# Patient Record
Sex: FEMALE | Race: WHITE | NOT HISPANIC OR LATINO | Employment: OTHER | URBAN - METROPOLITAN AREA
[De-identification: names, ages, dates, MRNs, and addresses within clinical notes are randomized per-mention and may not be internally consistent; named-entity substitution may affect disease eponyms.]

---

## 2018-01-20 ENCOUNTER — OFFICE VISIT - HEALTHEAST (OUTPATIENT)
Dept: FAMILY MEDICINE | Facility: CLINIC | Age: 68
End: 2018-01-20

## 2018-01-20 DIAGNOSIS — J01.20 SUBACUTE ETHMOIDAL SINUSITIS: ICD-10-CM

## 2019-01-23 ENCOUNTER — RECORDS - HEALTHEAST (OUTPATIENT)
Dept: ADMINISTRATIVE | Facility: OTHER | Age: 69
End: 2019-01-23

## 2019-01-23 ENCOUNTER — OFFICE VISIT (OUTPATIENT)
Dept: FAMILY MEDICINE | Facility: CLINIC | Age: 69
End: 2019-01-23
Payer: COMMERCIAL

## 2019-01-23 VITALS
SYSTOLIC BLOOD PRESSURE: 145 MMHG | DIASTOLIC BLOOD PRESSURE: 75 MMHG | RESPIRATION RATE: 18 BRPM | HEIGHT: 64 IN | TEMPERATURE: 97.8 F | OXYGEN SATURATION: 98 % | BODY MASS INDEX: 29.71 KG/M2 | HEART RATE: 90 BPM | WEIGHT: 174 LBS

## 2019-01-23 DIAGNOSIS — I10 ESSENTIAL HYPERTENSION: ICD-10-CM

## 2019-01-23 DIAGNOSIS — Z00.00 WELLNESS EXAMINATION: Primary | ICD-10-CM

## 2019-01-23 DIAGNOSIS — K21.9 GASTROESOPHAGEAL REFLUX DISEASE, ESOPHAGITIS PRESENCE NOT SPECIFIED: ICD-10-CM

## 2019-01-23 DIAGNOSIS — Z00.00 ROUTINE GENERAL MEDICAL EXAMINATION AT A HEALTH CARE FACILITY: Primary | ICD-10-CM

## 2019-01-23 LAB
ALT SERPL-CCNC: 33 U/L (ref 0–45)
AST SERPL-CCNC: 42 U/L (ref 0–45)
BUN SERPL-MCNC: 16 MG/DL (ref 7–30)
CALCIUM SERPL-MCNC: 9.7 MG/DL (ref 8.5–10.4)
CHLORIDE SERPLBLD-SCNC: 105 MMOL/L (ref 94–109)
CHOLEST SERPL-MCNC: 250 MG/DL
CHOLEST/HDLC SERPL: 3.6 RATIO
CO2 SERPL-SCNC: 27 MMOL/L (ref 20–32)
CREAT SERPL-MCNC: 0.8 MG/DL (ref 0.6–1.3)
EGFR CALCULATED (BLACK REFERENCE): >90 ML/MIN
EGFR CALCULATED (NON BLACK REFERENCE): 76.3 ML/MIN
GLUCOSE SERPL-MCNC: 88 MG/DL (ref 60–109)
GLUCOSE SERPL-MCNC: 88 MG/DL (ref 60–109)
HCT VFR BLD AUTO: 44 % (ref 35–47)
HDLC SERPL-MCNC: 69 MG/DL
HEMOGLOBIN: 13.5 G/DL (ref 11.7–15.7)
LDLC SERPL CALC-MCNC: 152 MG/DL (ref 0–99)
MCH RBC QN AUTO: 29.5 PG (ref 26.5–35)
MCHC RBC AUTO-ENTMCNC: 30.7 G/DL (ref 32–36)
MCV RBC AUTO: 96.2 FL (ref 78–100)
PLATELET # BLD AUTO: 269 K/UL (ref 150–450)
POTASSIUM SERPL-SCNC: 3.6 MMOL/L (ref 3.4–5.3)
RBC # BLD AUTO: 4.57 M/UL (ref 3.8–5.2)
SODIUM SERPL-SCNC: 143 MMOL/L (ref 133–144)
TRIGL SERPL-MCNC: 141 MG/DL
VLDL-CHOLESTEROL: 28 MG/DL (ref 7–32)
WBC # BLD AUTO: 9.3 K/UL (ref 4–11)

## 2019-01-23 RX ORDER — OMEPRAZOLE 20 MG/1
20 TABLET, DELAYED RELEASE ORAL DAILY
Qty: 90 TABLET | Refills: 1 | Status: SHIPPED | OUTPATIENT
Start: 2019-01-23 | End: 2019-11-11

## 2019-01-23 SDOH — HEALTH STABILITY: MENTAL HEALTH: HOW MANY STANDARD DRINKS CONTAINING ALCOHOL DO YOU HAVE ON A TYPICAL DAY?: 1 OR 2

## 2019-01-23 SDOH — HEALTH STABILITY: MENTAL HEALTH: HOW OFTEN DO YOU HAVE A DRINK CONTAINING ALCOHOL?: 2-4 TIMES A MONTH

## 2019-01-23 ASSESSMENT — MIFFLIN-ST. JEOR: SCORE: 1314.26

## 2019-01-23 ASSESSMENT — PATIENT HEALTH QUESTIONNAIRE - PHQ9: SUM OF ALL RESPONSES TO PHQ QUESTIONS 1-9: 1

## 2019-01-23 NOTE — NURSING NOTE
Medicare Wellness Visit  Health Risk Assessment           Health Risk Assessment / Review of Systems     Constitutional: Any fevers or night sweats? No     Eyes:  Vision problems   No     Hearing Do you feel you have hearing loss?  No     Cardiovascular: Any chest pain, fast or irregular heart beat, calf pain with walking?     No           Respiratory:   Any breathing problems or cough?   No     Gastrointestinal: Any stomach or stool problems?   No      Genitourinary: Do you have difficulty controlling urination?   No     Muscles and Joints: Any joint stiffness or soreness?   No - hx known arthritis in bilateral knee.    Skin: Any concerning lesions or moles?   No     Nervous System: Any loss of strength or feeling, numbness or tingling, shaking, dizziness, or headache?  No     Mental Health: Any depression, anxiety or problems sleeping? Yes- has noticed being more worrisome than usual,more anxiousness. Trigger- worried about her  and his medical health.     Cognition: Do you have any problems with your memory?  No            Medical Care     What other specialists or organizations are involved in your medical care?  NONE  Patient Care Team       Relationship Specialty Notifications Start End    Jamzin Huertas MD PCP - General Family Practice  1/21/19     Phone: [phone] Fax: [phone]         [address] [place], [place] [id number]          Have you been to the ER or overnight in the hospital in the last year?  No          Social History / Home Safety       Marital Status:  Who lives in your household?     Do you feel threatened or controlled by a partner, ex-partner or anyone in your life? No     Has anyone hurt you physically, for example by pushing, hitting, slapping or kicking you   or forcing you to have sex? No          Does your home have any of the following safety concerns; loose rugs in the hallway,  bathrooms with no grab bars by the tub or toilet, stairs with no handrails  or poorly lit areas?  No     Do you need help with dressing yourself, bathing, eating or getting around your home?  No     Do you need help with the phone, transportation, shopping, preparing meals, housework, laundry, medications or managing money?No       Risk Behaviors and Healthy Habits     History   Smoking Status     Not on file   Smokeless Tobacco     Not on file     How many servings of fruits and vegetables do you eat a day? < 1 serving a day. Advised and daily recommendation of eating 4-5 servings of fruits and vegetables a day. Nolvia states she has been trying to increase intake which started this week.    Exercise: previously no routine exercise, during summer time will do yard work and gardening. Just this week started back on getting into routine. Treadmill and light weights x 30 mins.    Do you frequently drive without a seatbelt? No     Do you use tobacco?  No     Do you use any other drugs? No         Do you use alcohol?Yes  Number of drinks per day -0-1  Number of drinking days a week - 2 drinks on weekend, does not feel her intake is an issue.      Frailty Assessment            Have you lost 10 or more pounds unintentionally in the previous year? No     How difficult is walking from one room to the other on the same level?not       Is it difficult to lift or carry something as heavy as 10 pounds?not      Compared with most (men/women) your age, would you say  that you are more active, less active, or about the same?  Same        FALL RISK ASSESSMENT 1/23/2019   Fallen 2 or more times in the past year? No   Any fall with injury in the past year? No     Timed up and go test- 8 seconds    Advance Directives:   Discussed with patient and family as appropriate. Has patient  completed advance directives and/or a living will? Yes- blank copy of an advance directive has been given to Nolvia to take home, review and complete.    Kristy Clements RN

## 2019-01-23 NOTE — NURSING NOTE
I completed a vision exam on Nolvia Kimball, pt passed.    L eye 20/25  R eye 20/20  Both eyes 20/20    Maira Ramirez, CMA

## 2019-01-23 NOTE — PROGRESS NOTES
SUBJECTIVE:                                                                                Nolvia Kimball is a 68 year old female who presents for a Welcome to Medicare Visit.    All Histories reviewed and updated in EPIC as appropriate.    Patient frequently cares for her  Abilio but neglects to take time to see the doctor herself. She therefore is behind on a lot of her screenings. She decided it's time to take care of herself too!    Takes her BP at home and it's always over 140- she'd like to start a BP med today.       Visual Acuity:  L eye 20/25  R eye 20/20  Both eyes 20/20    Social History     Tobacco Use     Smoking status: Never Smoker     Smokeless tobacco: Never Used   Substance Use Topics     Alcohol use: Not on file       Diet: Drinking more water, eating more fruits and veggies than in the past, smaller portions, trying to avoid processed sugars.   Physical Activity: patient exercises 3-5 times weekly- uses treadmill and bike and weights. Goal is daily.     The patient does not drink >3 drinks per day nor >7 drinks per week.      Today's PHQ-2 Score:      Do you have a Health Care Directive? No: Advance care planning reviewed with patient; information given to patient to review.    Current providers sharing in care for this patient include:   Patient Care Team:  Jazmin Huertas MD as PCP - General (Family Practice)      Hearing impairment: No    Ability to successfully perform activities of daily living: Yes, no assistance needed    Fall risk:   Fallen 2 or more times in the past year?: No    Any fall with injury in the past year?: No    Home safety:  none identified      The following health maintenance items are reviewed in Epic and correct as of today:  Health Maintenance   Topic Date Due     PHQ-2 Q1 YR  05/13/1964     HEPATITIS C SCREENING  05/13/1970     DTAP/TDAP/TD IMMUNIZATION (1 - Tdap) 05/13/1977     MAMMO SCREEN Q2 YR (SYSTEM ASSIGNED)  05/13/1992     LIPID SCREEN Q5 YR FEMALE  "(SYSTEM ASSIGNED)  1997     COLON CANCER SCREEN (SYSTEM ASSIGNED)  2002     ZOSTER IMMUNIZATION (1 of 2) 2002     ADVANCE DIRECTIVE PLANNING Q5 YRS  2007     FALL RISK ASSESSMENT  2017     DEXA SCAN SCREENING (SYSTEM ASSIGNED)  2017     INFLUENZA VACCINE (1) 2018     IPV IMMUNIZATION  Aged Out     MENINGITIS IMMUNIZATION  Aged Out       ROS:  Constitutional, HEENT, cardiovascular, pulmonary, GI, , musculoskeletal, neuro, skin, endocrine and psych systems are negative, except as otherwise noted.      SCREENING FOR PREVENTION and EARLY DETECTION     ECG (if done)not performed    Screening Lipid Level (covered every 5 years ): Recommended and patient accepted testing.  Cervical cancer screening:  Covered  until age 70 every 2 years unless high risk):  Testing not indicated -- patient has completed adequate lifetime screening  HIV screening (at risk ):  Testing not indicated   Colon CA Screening (>50-75 ) (FITT annually or colonoscopy every 10years):  Recommended and patient accepted testing.  Breast CA Screenin-2 years 50-74years:  Recommended and patient accepted testing., Dexa Scan (>65 yrs) (covered every 2 years):  Recommended and patient accepted testing. and Diabetes Screening : [facility] covered if at risk (obesity, HTN, dyslipidemia, FH): Recommended and patient accepted testing.    CV Risk based on Pooled Cohort Risk: 8%  Pooled Cohort Risk Calculator    Advanced Directives: Discussed and patient desires to to have further information and discuss with family.     Reviewed Immunization Record Today  Not due      OBJECTIVE:                                                                                    Vitals: /75 (BP Location: Left arm, Patient Position: Sitting, Cuff Size: Adult Regular)   Pulse 90   Temp 97.8  F (36.6  C) (Oral)   Resp 18   Ht 1.626 m (5' 4\")   Wt 78.9 kg (174 lb)   SpO2 98%   BMI 29.87 kg/m    BMI= Body mass index is 29.87 " kg/m .    EXAM:   GENERAL APPEARANCE: healthy, alert and no distress  EYES: Eyes grossly normal to inspection, PERRL and conjunctivae and sclerae normal  HENT: ear canals and TM's normal, nose and mouth without ulcers or lesions, oropharynx clear and oral mucous membranes moist  NECK: no adenopathy, no asymmetry, masses, or scars and thyroid normal to palpation  RESP: lungs clear to auscultation - no rales, rhonchi or wheezes  CV: regular rate and rhythm, normal S1 S2, no S3 or S4, no murmur, click or rub, no peripheral edema and peripheral pulses strong  ABDOMEN: soft, nontender, no hepatosplenomegaly, no masses and bowel sounds normal  MS: no musculoskeletal defects are noted and gait is age appropriate without ataxia  SKIN: no suspicious lesions or rashes  NEURO: Normal strength and tone, sensory exam grossly normal, mentation intact and speech normal  PSYCH: mentation appears normal and affect normal/bright    ASSESSMENT/PLAN:                                                                    1. Routine general medical examination at a health care facility  - GASTROENTEROLOGY ADULT REF PROCEDURE ONLY Other; ([facility], Dr. Reagan)  - Dexa hip/pelvis/spine*; Future  - MA SCREENING DIGITAL BILAT; Future  - Hepatitis C Antibody ([facility])  - Hepatitis B Surface Ag ([facility])  - Lipid Panel Plus ([facility]) - Results < 1 hr  - Basic Metabolic Panel ([facility]) - Results < 1 hr  - CBC with Plt ([facility])    2. Gastroesophageal reflux disease, esophagitis presence not specified  Already tried OTC H2 blocker. Discussed lifestyle changes plus will trial PPI.   - omeprazole (PRILOSEC OTC) 20 MG EC tablet; Take 1 tablet (20 mg) by mouth daily  Dispense: 90 tablet; Refill: 1    3. Essential hypertension  Home measurements are all elevated. Based on renal function, safe to try lisinopril. Start with 10mg daily, RTC in 2 weeks for BP check and BMP.       End of Life Planning:   Patient currently has an advanced  "directive: No.  I have verified the patient's ablity to prepare an advanced directive/make health care decisions.  Literature was provided to assist patient in preparing an advanced directive.    COUNSELING:  Reviewed preventive health counseling, as reflected in patient instructions       Regular exercise       Healthy diet/nutrition       Dental care       Aspirin Prophylaxsis-- not indicated       Alcohol Use -- she has no problematic alcohol use       Osteoporosis Prevention/Bone Health-- will complete DEXA       Consider lung cancer screening for ages 55-80 years and 30 pack-year smoking history --she does not qualify for this s       Colon cancer screening       Hepatitis C screening       The 10-year ASCVD risk score (Martinezgarrett DACOSTA Jr., et al., 2013) is: 8%           Advanced Planning   Estimated body mass index is 29.87 kg/m  as calculated from the following:    Height as of this encounter: 1.626 m (5' 4\").    Weight as of this encounter: 78.9 kg (174 lb).  Weight management plan: Discussed healthy diet and exercise guidelines   reports that  has never smoked. she has never used smokeless tobacco.      Appropriate preventive services were discussed with this patient, including applicable screening as appropriate for cardiovascular disease, diabetes, osteopenia/osteoporosis, and glaucoma.  As appropriate for age/gender, discussed screening for colorectal cancer, prostate cancer, breast cancer, and cervical cancer. Checklist reviewing preventive services available has been given to the patient.    Reviewed patients plan of care and provided an AVS. The Basic Care Plan (routine screening as documented in Health Maintenance) for Nolvia meets the Care Plan requirement. This Care Plan has been established and reviewed with the Patient.    Jazmin Huertas MD  [facility]  "

## 2019-01-23 NOTE — PATIENT INSTRUCTIONS
PERSONAL PREVENTIVE SERVICES PLAN - SERVICES     Review these tests with your medical staff then decide which ones you want and take this page home for your reference      SCREENING TESTS     Description   Year of Last Screening   Recommended Today?   Heart disease screening blood tests    Cholesterol level Reducing cholesterol can reduce your risk of heart attacks by 25%.  Screening is recommended yearly if you are at risk of heart disease otherwise every 4-5 years 5 yrs or more Yes; Recommended-pt is fasting today   Diabetes screening tests    Hemoglobin A1c blood test   Finding and treating diabetes early can reduce complications.  Screening recommended/covered yearly if you have high blood pressure, high cholesterol, obesity (BMI >30), or a history of high blood glucose tests; or 2 of the following: family history of diabetes, overweight (BMI >25 but <30), age 65 years or older, and a history of diabetes of pregnancy or gave birth to baby weighing more than 9 lbs.  Yes; Recommended    Hepatitis B screening Finding hepatitis B early can reduce complications.  Screening is recommended for persons with selected risk factors. 35 yrs ago,informed had Hep B exposure Yes; Recommended    Hepatitis C screening Finding hepatitis C early can reduce complications.  Screening is recommended for all persons born from 1945 through 1965 and for those with selected other risk factors.   Yes; Recommended    HIV screening Finding HIV early can reduce complications.  Screening is recommended for persons with risk factors for HIV infection.  No: is not indicated today.   Glaucoma screening Early detection of glaucoma can prevent blindness.   Please talk to your eye doctor about this.       SCREENING TESTS     Description   Year of Last Screening   Recommended Today?   Colorectal cancer screening    Fecal occult blood test     Screening colonoscopy Screening for colon cancer has been shown to reduce death from colon cancer by  25-30%. Screening recommended to start at 50 years and continuing until age 75 years.    Yes; Recommended    Breast Cancer Screening (women)    Mammogram Mammogram screening for breast cancer has been shown to reduce the risk of dying from breast cancer and prolong life. Screening is recommended every 1-2 years for women aged 50 to 74 years.  2014 Yes; Recommended    Cervical Cancer screening (women)    Pap Cervical pap smears can reduce cervical cancer. Screening is recommended annually if high risk (history of abnormal pap smears) otherwise every 2-3 years, stop screening at 65 years of age if history of normal paps. 2014 No: is not indicated today.   Screening for Osteoporosis:  Bone mass measurements (women)    Dexa Scan Screening and treating Osteoporosis can reduce the risk of hip and spine fractures. Screening is recommended in women 65 years or older and in women and men at risk of osteoporosis.  Yes; Recommended    Screening for Lung Cancer     Low-dose CT scanning Screening can reduce mortality in persons aged 55-80 who have smoked at least 30 pack-years and who are either still smoking or have quit in the past 15 years.  No: is not indicated today.   Abdominal Aortic Aneurysm (AAA) screening    Ultrasound (US)   An aneurysm treated before rupture is very safe -a ruptured aneurysm can be fatal.  Screening  by US for AAA is limited to patients who meet one of the following criteria:    Men who are 65-75 years old and have smoked more than 100 cigarettes in their lifetime    Anyone with a family history of abdominal aortic aneurysm  No: is not indicated today.     Here are your recommended immunizations.  Take this home for your reference.                                                    IMMUNIZATIONS Description Recommend today?     Influenza (Flu shot) Prevents flu; should get every year No; is up to date. Fall 2018   PCV 13 Pneumonia vaccination; you get it once Yes; Recommended    PPSV 23 Second  pneumonia vaccination; usually get it 1 year after PCV 13 No: is not indicated today.   Zoster (Shingles) Prevents shingles; you get it once  (Check with Part D insurance for coverage, must receive at a pharmacy, not clinic) Yes; Recommended    Tetanus Prevents tetanus; once every 10 years No: is not indicated today. Last vaccination was 2014     Hepatitis B  If you have any of the following risk factors you should be immunized for hepatitis B: severe kidney disease, people who live in the same house as a carrier of Hepatitis B virus, people who live in  institutions (e.g. nursing homes or group homes), homosexual men, patients with hemophilia who received Factor VIII or IX concentrates, abusers of illicit injectable drugs No: is not indicated today.      PATIENT INSTRUCTIONS    Yearly exam:     See your health care provider every year in order to review changes in your health, review medicines that you take, and discuss preventive care needs such as immunizations and cancer screening.    Get a flu shot each year.     Advance Directives:    If you have not done so, you are encouraged to complete advance directives and/or a living will.   More information about advance directives can be found at: http://www.[place]med.org/advocacy/Key-Issues/Advance-Directives    Nutrition:     Eat at least 5 servings of fruits and vegetables each day.     Eat whole-grain bread, whole-wheat pasta and brown rice instead of white grains and rice.     Talk to your doctor about Calcium and Vitamin D.     Lifestyle:    Exercise for at least 150 minutes a week (30 minutes a day, 5 days a week). This will help you control your weight and prevent disease.     Limit alcohol to one drink per day.     If you smoke, try to quit - your doctor will be happy to help.     Wear sunscreen to prevent skin cancer.     See your dentist every six months for an exam and cleaning.     See your eye doctor every 1 to 2 years to screen for conditions such as  glaucoma, macular degeneration and cataracts.                            Preventive Health Recommendations    See your health care provider every year to    Review health changes.     Discuss preventive care.      Review your medicines if your doctor has prescribed any.      You no longer need a yearly Pap test unless you've had an abnormal Pap test in the past 10 years. If you have vaginal symptoms, such as bleeding or discharge, be sure to talk with your provider about a Pap test.      Every 1 to 2 years, have a mammogram.  If you are over 69, talk with your health care provider about whether or not you want to continue having screening mammograms.      Every 10 years, have a colonoscopy. Or, have a yearly FIT test (stool test). These exams will check for colon cancer.       Have a cholesterol test every 5 years, or more often if your doctor advises it.       Have a diabetes test (fasting glucose) every three years. If you are at risk for diabetes, you should have this test more often.       At age 65, have a bone density scan (DEXA) to check for osteoporosis (brittle bone disease).    Shots:    Get a flu shot each year.    Get a tetanus shot every 10 years.    Talk to your doctor about your pneumonia vaccines. There are now two you should receive - Pneumovax (PPSV 23) and Prevnar (PCV 13).    Talk to your pharmacist about the shingles vaccine.    Talk to your doctor about the hepatitis B vaccine.    Nutrition:     Eat at least 5 servings of fruits and vegetables each day.      Eat whole-grain bread, whole-wheat pasta and brown rice instead of white grains and rice.      Get adequate Calcium and Vitamin D.     Lifestyle    Exercise at least 150 minutes a week (30 minutes a day, 5 days a week). This will help you control your weight and prevent disease.      Limit alcohol to one drink per day.      No smoking.       Wear sunscreen to prevent skin cancer.       See your dentist twice a year for an exam and  cleaning.      See your eye doctor every 1 to 2 years to screen for conditions such as glaucoma, macular degeneration and cataracts.    Personalized Prevention Plan  You are due for the preventive services outlined below.  Your care team is available to assist you in scheduling these services.  If you have already completed any of these items, please share that information with your care team to update in your medical record.  Health Maintenance Due   Topic Date Due     Depression Assessment 2 - yearly  05/13/1964     Hepatitis C Screening  05/13/1970     Diptheria Tetanus Pertussis (DTAP/TDAP/TD) Vaccine (1 - Tdap) 05/13/1977     Mammogram - every 2 years  05/13/1992     Cholesterol Lab - every 5 years  05/13/1997     Colon Cancer Screening - every 10 years.  05/13/2002     Zoster (Chicken Pox) Vaccine (1 of 2) 05/13/2002     Discuss Advance Directive Planning  05/13/2007     FALL RISK ASSESSMENT  05/13/2017     Bone Density Screening (Dexa)  05/13/2017     Flu Vaccine (1) 09/01/2018       Referral for ( TEST )  :      Colonoscopy   LOCATION/PLACE/Provider :    [facility]   DATE & TIME :     2-7-2019 at 7:00am, arriving by 6:00am  PHONE :     [phone]  FAX :     [phone]  Appointment made by clinic staff/:    Clementina

## 2019-01-24 LAB
HBV SURFACE AG SERPL QL IA: NEGATIVE
HCV AB SER QL: NEGATIVE

## 2019-01-28 ENCOUNTER — MYC MEDICAL ADVICE (OUTPATIENT)
Dept: FAMILY MEDICINE | Facility: CLINIC | Age: 69
End: 2019-01-28

## 2019-01-28 ENCOUNTER — RECORDS - HEALTHEAST (OUTPATIENT)
Dept: ADMINISTRATIVE | Facility: OTHER | Age: 69
End: 2019-01-28

## 2019-01-28 RX ORDER — LISINOPRIL 10 MG/1
10 TABLET ORAL DAILY
Qty: 30 TABLET | Refills: 0 | Status: SHIPPED | OUTPATIENT
Start: 2019-01-28 | End: 2019-02-13

## 2019-01-28 RX ORDER — ATORVASTATIN CALCIUM 40 MG/1
40 TABLET, FILM COATED ORAL DAILY
Qty: 90 TABLET | Refills: 3 | Status: SHIPPED | OUTPATIENT
Start: 2019-01-28 | End: 2019-02-13 | Stop reason: SINTOL

## 2019-01-28 NOTE — TELEPHONE ENCOUNTER
Of note: correct Lisinopril is not a refill but a new start of medication for essential hypertension after clinic visit and discussion. Phone conversation to clarify all questions. Per Nolvia feels comfortable starting Lisinopril 10 mg, will plan on following up in two weeks as recommended. Will start Lisinopril for a few days then start Atorvastatin in case should experience any side effects, this can be easily identified. Both medications will be new to Nolvia. Jordana JOY

## 2019-01-29 DIAGNOSIS — Z00.00 ROUTINE GENERAL MEDICAL EXAMINATION AT A HEALTH CARE FACILITY: ICD-10-CM

## 2019-01-31 ENCOUNTER — TELEPHONE (OUTPATIENT)
Dept: FAMILY MEDICINE | Facility: CLINIC | Age: 69
End: 2019-01-31

## 2019-01-31 NOTE — TELEPHONE ENCOUNTER
[facility] phone call message- general phone call:    Reason for call: Patient states she has an upcoming colonoscopy and her notes state that she is able to take her current medications for high blood pressure and diabetes. She's wondering if she would still be able to take her Lipitor and an-acid. Please call and advise.       Return call needed: Yes    OK to leave a message on voice mail? Yes    Primary language: English      needed? No    Call taken on January 31, 2019 at 8:49 AM by Fabiana Marin

## 2019-02-11 ENCOUNTER — HOSPITAL ENCOUNTER (OUTPATIENT)
Dept: MAMMOGRAPHY | Facility: CLINIC | Age: 69
Discharge: HOME | End: 2019-02-11
Attending: STUDENT IN AN ORGANIZED HEALTH CARE EDUCATION/TRAINING PROGRAM

## 2019-02-11 DIAGNOSIS — Z12.31 VISIT FOR SCREENING MAMMOGRAM: ICD-10-CM

## 2019-02-12 ENCOUNTER — RECORDS - HEALTHEAST (OUTPATIENT)
Dept: ADMINISTRATIVE | Facility: OTHER | Age: 69
End: 2019-02-12

## 2019-02-13 ENCOUNTER — AMBULATORY - HEALTHEAST (OUTPATIENT)
Dept: ADMINISTRATIVE | Facility: REHABILITATION | Age: 69
End: 2019-02-13

## 2019-02-13 ENCOUNTER — OFFICE VISIT (OUTPATIENT)
Dept: FAMILY MEDICINE | Facility: CLINIC | Age: 69
End: 2019-02-13
Payer: COMMERCIAL

## 2019-02-13 VITALS
BODY MASS INDEX: 30.02 KG/M2 | SYSTOLIC BLOOD PRESSURE: 135 MMHG | RESPIRATION RATE: 16 BRPM | DIASTOLIC BLOOD PRESSURE: 83 MMHG | WEIGHT: 169.4 LBS | OXYGEN SATURATION: 99 % | HEIGHT: 63 IN | HEART RATE: 73 BPM | TEMPERATURE: 97.4 F

## 2019-02-13 DIAGNOSIS — M54.50 CHRONIC BILATERAL LOW BACK PAIN WITHOUT SCIATICA: ICD-10-CM

## 2019-02-13 DIAGNOSIS — M54.50 CHRONIC BILATERAL LOW BACK PAIN WITHOUT SCIATICA: Primary | ICD-10-CM

## 2019-02-13 DIAGNOSIS — G89.29 CHRONIC BILATERAL LOW BACK PAIN WITHOUT SCIATICA: Primary | ICD-10-CM

## 2019-02-13 DIAGNOSIS — K21.9 GASTROESOPHAGEAL REFLUX DISEASE WITHOUT ESOPHAGITIS: ICD-10-CM

## 2019-02-13 DIAGNOSIS — I10 ESSENTIAL HYPERTENSION: ICD-10-CM

## 2019-02-13 DIAGNOSIS — G89.29 CHRONIC BILATERAL LOW BACK PAIN WITHOUT SCIATICA: ICD-10-CM

## 2019-02-13 LAB
BUN SERPL-MCNC: 14 MG/DL (ref 7–30)
CALCIUM SERPL-MCNC: 9.8 MG/DL (ref 8.5–10.4)
CHLORIDE SERPLBLD-SCNC: 105 MMOL/L (ref 94–109)
CO2 SERPL-SCNC: 27 MMOL/L (ref 20–32)
CREAT SERPL-MCNC: 0.8 MG/DL (ref 0.6–1.3)
EGFR CALCULATED (BLACK REFERENCE): >90 ML/MIN
EGFR CALCULATED (NON BLACK REFERENCE): 76.3 ML/MIN
GLUCOSE SERPL-MCNC: 88 MG/DL (ref 60–109)
POTASSIUM SERPL-SCNC: 3.9 MMOL/L (ref 3.4–5.3)
SODIUM SERPL-SCNC: 146 MMOL/L (ref 133–144)

## 2019-02-13 RX ORDER — ROSUVASTATIN CALCIUM 5 MG/1
5 TABLET, COATED ORAL DAILY
Qty: 30 TABLET | Refills: 0 | Status: SHIPPED | OUTPATIENT
Start: 2019-02-13 | End: 2019-03-11

## 2019-02-13 RX ORDER — LISINOPRIL 10 MG/1
10 TABLET ORAL DAILY
Qty: 30 TABLET | Refills: 0 | Status: CANCELLED | OUTPATIENT
Start: 2019-02-13

## 2019-02-13 RX ORDER — LISINOPRIL 10 MG/1
10 TABLET ORAL DAILY
Qty: 90 TABLET | Refills: 0 | Status: SHIPPED | OUTPATIENT
Start: 2019-02-13 | End: 2019-05-20

## 2019-02-13 ASSESSMENT — MIFFLIN-ST. JEOR: SCORE: 1280.52

## 2019-02-13 NOTE — PROGRESS NOTES
HPI:     Nolvia Kimball is a 68 year old female with PMH of essential htn who presents for a few concerns.    Nolvia Kimball is here on her own.     Preventive care update  --Got mammo  --Got pneumovax at pharmacy  --Waiting for shingles vaccine to be available at pharmacy  --Colonoscopy next Thursday   --We started statin last visit and she had watery diarrhea and bad flatulence after starting it. She stopped it and the GI symptoms resolved. No muscle aches.    HTN  BP consistently high at home before last visit  Started lisinopril 10mg daily  No side effects  Also is trying to eat a very healthy diet and exercise more.  Has lost 5 lb in about 4 weeks.  Since starting lisinopril BP at home have been: 106-117/72-81 (She wrote them down and brought them in on a sheet of paper for me to see.)    Back pain  Long history (>20 yrs) of intermittent back pain  She doesn't have pain every day, it just seems to flare every once in a while.   No history of trauma to her back, no history of MVA. Repetitive motion seems to flare it.  She thinks the pain is about midline.  Standing for long periods causes back to be sore. Cooking is one thing that bothers her for this reason.  Every once in a while hard to bend over, hard to straighten up  No pain down the legs or weakness, numbness, no incontinence.   Has not seen a doctor for this in the past.   No imaging in the past.      GERD: We started omeprazole last visit and since she started it her GERD symptoms completely resolved. She loves it! She's wondering if she needs to take it daily.          PMHX:     Patient Active Problem List   Diagnosis     Essential hypertension     Gastroesophageal reflux disease without esophagitis       Current Outpatient Medications   Medication Sig Dispense Refill     lisinopril (PRINIVIL/ZESTRIL) 10 MG tablet Take 1 tablet (10 mg) by mouth daily 90 tablet 0     omeprazole (PRILOSEC OTC) 20 MG EC tablet Take 1 tablet (20 mg) by mouth daily 90  "tablet 1     rosuvastatin (CRESTOR) 5 MG tablet Take 1 tablet (5 mg) by mouth daily 30 tablet 0       Social History     Tobacco Use     Smoking status: Former Smoker     Packs/day: 1.00     Years: 10.00     Pack years: 10.00     Types: Cigarettes     Smokeless tobacco: Never Used   Substance Use Topics     Alcohol use: Yes     Alcohol/week: 0.6 oz     Types: 1 Shots of liquor per week     Frequency: 2-4 times a month     Drinks per session: 1 or 2     Drug use: No       Social History     Social History Narrative     Not on file       Not on File    No results found for this or any previous visit (from the past 24 hour(s)).         Review of Systems:   7 point ROS negative except as noted.           Physical Exam:     Vitals:    02/13/19 1338   BP: 135/83   Pulse: 73   Resp: 16   Temp: 97.4  F (36.3  C)   TempSrc: Oral   SpO2: 99%   Weight: 76.8 kg (169 lb 6.4 oz)   Height: 1.605 m (5' 3.19\")     Body mass index is 29.83 kg/m .    General: Alert, well-appearing pleasant female in NAD  HEENT: PERRL, moist oral mucus membranes  Pulm: CTA BL, no tachypnea  CV: RRR, no murmur  Back: No deformity or skin lesion on inspection. No pain of the spinous processes. No pain of paraspinal muscles. Patient is tender at bilateral SI joints. Normal flexion and extension of spine, normal lateral bending and rotation ROM. Straight leg raise is negative BL. 5/5 hip flexion and knee flex/ext BL  Ext: Warm, well perfused, 2+ BL radial pulses, no LE edema  Skin: No rash on limited skin exam  Psych: Mood bright, full affect, rational thought content and process      Assessment and Plan     1. Essential hypertension  BP is at goal. Encouraged her to bring her cuff to next visit. Weight loss is likely helping too- encouraged her to continue with portion control and more exercise as she has been doing. Unfortunately the statin seems to have given her diarrhea which is odd side effect but will switch to hydrophilic statin rosuvastatin at " very low dose to see if this helps. Potassium level is normal today, continue lisinopril. Na is a little high, recheck next visit.   - Basic Metabolic Panel ([facility]) - Results < 1 hr  - rosuvastatin (CRESTOR) 5 MG tablet; Take 1 tablet (5 mg) by mouth daily  Dispense: 30 tablet; Refill: 0  - lisinopril (PRINIVIL/ZESTRIL) 10 MG tablet; Take 1 tablet (10 mg) by mouth daily  Dispense: 90 tablet; Refill: 0    2. Chronic bilateral low back pain without sciatica  SI pain bilaterally. Since she has never had conservative therapy for this before and she has no red flag signs, I advised we try PT for 6 weeks and if no improvement, pursue xray at that time.   - PHYSICAL THERAPY REFERRAL; Future    3. Gastroesophageal reflux disease without esophagitis  Continue omeprazole- can try every other day to see if symptoms are still controlled. At some point we may want to try H2 blocker instead of PPI        Medications Discontinued During This Encounter   Medication Reason     atorvastatin (LIPITOR) 40 MG tablet Side effects     lisinopril (PRINIVIL/ZESTRIL) 10 MG tablet Reorder       Options for treatment and follow-up care were reviewed with the patient and/or guardian. Nolvia Kimball and/or guardian engaged in the decision making process and verbalized understanding of the options discussed and agreed with the final plan.    Jazmin Huertas MD  [facility]   Pager: [phone]

## 2019-02-14 PROBLEM — I10 ESSENTIAL HYPERTENSION: Status: ACTIVE | Noted: 2019-02-14

## 2019-02-14 PROBLEM — K21.9 GASTROESOPHAGEAL REFLUX DISEASE WITHOUT ESOPHAGITIS: Status: ACTIVE | Noted: 2019-02-14

## 2019-02-14 PROBLEM — I10 ESSENTIAL HYPERTENSION: Chronic | Status: ACTIVE | Noted: 2019-02-14

## 2019-02-21 ENCOUNTER — MYC MEDICAL ADVICE (OUTPATIENT)
Dept: FAMILY MEDICINE | Facility: CLINIC | Age: 69
End: 2019-02-21

## 2019-02-21 ENCOUNTER — RECORDS - HEALTHEAST (OUTPATIENT)
Dept: RADIOLOGY | Facility: CLINIC | Age: 69
End: 2019-02-21

## 2019-02-27 ENCOUNTER — AMBULATORY - HEALTHEAST (OUTPATIENT)
Dept: OTHER | Facility: CLINIC | Age: 69
End: 2019-02-27

## 2019-02-27 ENCOUNTER — DOCUMENTATION ONLY (OUTPATIENT)
Dept: OTHER | Facility: CLINIC | Age: 69
End: 2019-02-27

## 2019-03-09 ENCOUNTER — MYC MEDICAL ADVICE (OUTPATIENT)
Dept: FAMILY MEDICINE | Facility: CLINIC | Age: 69
End: 2019-03-09

## 2019-03-11 DIAGNOSIS — I10 ESSENTIAL HYPERTENSION: ICD-10-CM

## 2019-03-11 RX ORDER — ROSUVASTATIN CALCIUM 5 MG/1
5 TABLET, COATED ORAL DAILY
Qty: 30 TABLET | Refills: 0 | Status: SHIPPED | OUTPATIENT
Start: 2019-03-11 | End: 2019-03-13

## 2019-04-25 ENCOUNTER — SURGERY - HEALTHEAST (OUTPATIENT)
Dept: SURGERY | Facility: AMBULATORY SURGERY CENTER | Age: 69
End: 2019-04-25

## 2019-04-25 ENCOUNTER — HOSPITAL ENCOUNTER (OUTPATIENT)
Dept: SURGERY | Facility: AMBULATORY SURGERY CENTER | Age: 69
Discharge: HOME | End: 2019-04-25
Attending: FAMILY MEDICINE | Admitting: FAMILY MEDICINE
Payer: COMMERCIAL

## 2019-04-25 ENCOUNTER — TRANSFERRED RECORDS (OUTPATIENT)
Dept: HEALTH INFORMATION MANAGEMENT | Facility: CLINIC | Age: 69
End: 2019-04-25

## 2019-04-25 DIAGNOSIS — Z12.11 COLON CANCER SCREENING: ICD-10-CM

## 2019-04-25 ASSESSMENT — MIFFLIN-ST. JEOR
SCORE: 1294.72
SCORE: 1294.72

## 2019-05-13 ENCOUNTER — OFFICE VISIT (OUTPATIENT)
Dept: FAMILY MEDICINE | Facility: CLINIC | Age: 69
End: 2019-05-13
Payer: COMMERCIAL

## 2019-05-13 VITALS
BODY MASS INDEX: 30.3 KG/M2 | WEIGHT: 171 LBS | RESPIRATION RATE: 14 BRPM | HEART RATE: 71 BPM | TEMPERATURE: 98.1 F | HEIGHT: 63 IN | DIASTOLIC BLOOD PRESSURE: 77 MMHG | SYSTOLIC BLOOD PRESSURE: 127 MMHG | OXYGEN SATURATION: 97 %

## 2019-05-13 DIAGNOSIS — I10 ESSENTIAL HYPERTENSION: Primary | Chronic | ICD-10-CM

## 2019-05-13 LAB
BUN SERPL-MCNC: 22 MG/DL (ref 7–30)
CALCIUM SERPL-MCNC: 9.7 MG/DL (ref 8.5–10.4)
CHLORIDE SERPLBLD-SCNC: 106 MMOL/L (ref 94–109)
CO2 SERPL-SCNC: 29 MMOL/L (ref 20–32)
CREAT SERPL-MCNC: 1.1 MG/DL (ref 0.6–1.3)
EGFR CALCULATED (BLACK REFERENCE): 63.9 ML/MIN
EGFR CALCULATED (NON BLACK REFERENCE): 52.8 ML/MIN
GLUCOSE SERPL-MCNC: 92 MG/DL (ref 60–109)
POTASSIUM SERPL-SCNC: 4.3 MMOL/L (ref 3.4–5.3)
SODIUM SERPL-SCNC: 144 MMOL/L (ref 133–144)

## 2019-05-13 RX ORDER — ROSUVASTATIN CALCIUM 5 MG/1
5 TABLET, COATED ORAL DAILY
Qty: 90 TABLET | Refills: 0 | Status: SHIPPED | OUTPATIENT
Start: 2019-05-13 | End: 2019-10-07

## 2019-05-13 ASSESSMENT — MIFFLIN-ST. JEOR: SCORE: 1282.78

## 2019-05-13 NOTE — PROGRESS NOTES
HPI:   Nolvia Kimball is a 69 year old  female with PMH of HTN who presents for blood-pressure follow-up.    - She is doing very well and has no complaints today  - She does not check her blood pressures at home  - She remains physically active and is busy gardening  - She denies any abdominal pain or diarrhea since switching from Atorvastatin to Rosuvastatin in February 2019    Only needs omeprazole about 3 times per week. Symptoms well controlled with this treatment regimen.       Chief Complaint   Patient presents with     Hypertension     recheck bp,      Results     recheck bmp     Medication Reconciliation            PMHX:     Patient Active Problem List   Diagnosis     Essential hypertension     Gastroesophageal reflux disease without esophagitis       Current Outpatient Medications   Medication Sig Dispense Refill     lisinopril (PRINIVIL/ZESTRIL) 10 MG tablet Take 1 tablet (10 mg) by mouth daily 90 tablet 0     omeprazole (PRILOSEC OTC) 20 MG EC tablet Take 1 tablet (20 mg) by mouth daily 90 tablet 1     rosuvastatin (CRESTOR) 5 MG tablet Take 1 tablet (5 mg) by mouth daily 90 tablet 0       Social History     Tobacco Use     Smoking status: Former Smoker     Packs/day: 1.00     Years: 10.00     Pack years: 10.00     Types: Cigarettes     Smokeless tobacco: Never Used   Substance Use Topics     Alcohol use: Yes     Alcohol/week: 0.6 oz     Types: 1 Shots of liquor per week     Frequency: 2-4 times a month     Drinks per session: 1 or 2     Drug use: No       Social History     Social History Narrative     Not on file       No Known Allergies    Results for orders placed or performed in visit on 05/13/19 (from the past 24 hour(s))   Basic Metabolic Panel ([facility]) - Results < 1 hr   Result Value Ref Range    Glucose 92.0 60.0 - 109.0 mg/dL    Urea Nitrogen 22.0 7.0 - 30.0 mg/dL    Creatinine 1.1 0.6 - 1.3 mg/dL    Sodium 144.0 133.0 - 144.0 mmol/L    Potassium 4.3 3.4 - 5.3 mmol/L    Chloride 106.0 94.0  "- 109.0 mmol/L    Carbon Dioxide 29.0 20.0 - 32.0 mmol/L    Calcium 9.7 8.5 - 10.4 mg/dL    eGFR Calculated (Non Black Reference) 52.8 (L) >60.0 mL/min    eGFR Calculated (Black Reference) 63.9 >60.0 mL/min            Review of Systems:    ROS: 10 point ROS neg other than the symptoms noted above in the HPI.         Physical Exam:     Vitals:    05/13/19 1312   BP: 127/77   Pulse: 71   Resp: 14   Temp: 98.1  F (36.7  C)   TempSrc: Oral   SpO2: 97%   Weight: 77.6 kg (171 lb)   Height: 1.605 m (5' 3.19\")     Body mass index is 30.11 kg/m .    General: Alert, well-appearing female in NAD  HEENT: EOMI, moist oral mucus membranes  Pulm: CTA BL, no tachypnea  CV: RRR, no murmur  Abd: soft, NTND, no masses  Ext: Warm, well perfused, no LE edema  Skin: No rash on limited skin exam  Psych: Mood appropriate to visit content, full affect, rational thought content and process      Assessment and Plan   Essential Hypertension  Asymptomatic. On Lisinopril 10mg daily. Blood pressure remains well-controlled in clinic today. K+ of 4.3 today. Tolerating Rosuvastatin 5mg daily. Given her ASCVD risk of 8%, we considered increasing her Rosuvastatin to 10mg daily. Patient preferred to keep her current dose of 5mg since she is not having any gastrointestinal symptoms at this dose. Will plan to check lipids in 6 months to further assess.  - Continue Lisinopril 10mg daily  - Check Lipid panel in 6 months. Consider increasing Rosuvastatin dose at this time  - Check BMP in 6 months    Elevated Creatinine  Creatinine of 1.1 today, which is increased from 0.8 in February 2019. Suspect that this is likely pre-renal in the setting of dehydration as patient has not drank any water today despite gardening all morning. Chronic kidney disease is also considered.  - Recheck BMP in 6 months    Follow up: Return to clinic in 6 months  Options for treatment and follow-up care were reviewed with the patient and/or guardian. Nolvia Kimball and/or guardian " engaged in the decision making process and verbalized understanding of the options discussed and agreed with the final plan.    Mahesh Valero, MS4  [facility]     I was present with the medical student who participated in the service and in the documentation of this note. I have verified the history and personally performed the physical exam and medical decision making, and have verified the content of the note, which accurately reflects my assessment of the patient and the plan of care.   Jazmin Huertas MD

## 2019-05-20 DIAGNOSIS — I10 ESSENTIAL HYPERTENSION: ICD-10-CM

## 2019-05-21 RX ORDER — LISINOPRIL 10 MG/1
10 TABLET ORAL DAILY
Qty: 90 TABLET | Refills: 1 | Status: SHIPPED | OUTPATIENT
Start: 2019-05-21 | End: 2019-11-05

## 2019-10-07 DIAGNOSIS — I10 ESSENTIAL HYPERTENSION: Chronic | ICD-10-CM

## 2019-10-08 RX ORDER — ROSUVASTATIN CALCIUM 5 MG/1
5 TABLET, COATED ORAL DAILY
Qty: 90 TABLET | Refills: 1 | Status: SHIPPED | OUTPATIENT
Start: 2019-10-08 | End: 2020-03-30

## 2019-11-05 ENCOUNTER — OFFICE VISIT (OUTPATIENT)
Dept: FAMILY MEDICINE | Facility: CLINIC | Age: 69
End: 2019-11-05
Payer: COMMERCIAL

## 2019-11-05 VITALS
DIASTOLIC BLOOD PRESSURE: 89 MMHG | OXYGEN SATURATION: 97 % | WEIGHT: 182.6 LBS | BODY MASS INDEX: 32.36 KG/M2 | HEART RATE: 73 BPM | HEIGHT: 63 IN | TEMPERATURE: 98 F | RESPIRATION RATE: 18 BRPM | SYSTOLIC BLOOD PRESSURE: 166 MMHG

## 2019-11-05 DIAGNOSIS — I10 ESSENTIAL HYPERTENSION: ICD-10-CM

## 2019-11-05 LAB
BUN SERPL-MCNC: 17 MG/DL (ref 7–30)
CALCIUM SERPL-MCNC: 9.2 MG/DL (ref 8.5–10.4)
CHLORIDE SERPLBLD-SCNC: 104 MMOL/L (ref 94–109)
CHOLEST SERPL-MCNC: 166 MG/DL
CHOLEST/HDLC SERPL: 2.5 RATIO
CO2 SERPL-SCNC: 27 MMOL/L (ref 20–32)
CREAT SERPL-MCNC: 1 MG/DL (ref 0.6–1.3)
EGFR CALCULATED (BLACK REFERENCE): 71.1 ML/MIN
EGFR CALCULATED (NON BLACK REFERENCE): 58.8 ML/MIN
GLUCOSE SERPL-MCNC: 97 MG/DL (ref 60–109)
HDLC SERPL-MCNC: 66 MG/DL
LDLC SERPL CALC-MCNC: 73 MG/DL (ref 0–99)
POTASSIUM SERPL-SCNC: 3.2 MMOL/L (ref 3.4–5.3)
SODIUM SERPL-SCNC: 143 MMOL/L (ref 133–144)
TRIGL SERPL-MCNC: 133 MG/DL
VLDL-CHOLESTEROL: 27 MG/DL (ref 7–32)

## 2019-11-05 RX ORDER — PNEUMOCOCCAL 13-VALENT CONJUGATE VACCINE 2.2; 2.2; 2.2; 2.2; 2.2; 4.4; 2.2; 2.2; 2.2; 2.2; 2.2; 2.2; 2.2 UG/.5ML; UG/.5ML; UG/.5ML; UG/.5ML; UG/.5ML; UG/.5ML; UG/.5ML; UG/.5ML; UG/.5ML; UG/.5ML; UG/.5ML; UG/.5ML; UG/.5ML
INJECTION, SUSPENSION INTRAMUSCULAR
Refills: 0 | COMMUNITY
Start: 2019-01-24

## 2019-11-05 RX ORDER — LISINOPRIL 20 MG/1
20 TABLET ORAL DAILY
Qty: 30 TABLET | Refills: 0 | Status: SHIPPED | OUTPATIENT
Start: 2019-11-05 | End: 2019-12-03

## 2019-11-05 RX ORDER — ZOSTER VACCINE RECOMBINANT, ADJUVANTED 50 MCG/0.5
KIT INTRAMUSCULAR
Refills: 1 | COMMUNITY
Start: 2019-03-21

## 2019-11-05 ASSESSMENT — MIFFLIN-ST. JEOR: SCORE: 1332.4

## 2019-11-05 NOTE — PROGRESS NOTES
HPI:       Nolvia Kimball is a 69 year old  female with a significant past medical history of hypertension who presents for follow up of concern(s) listed below    Hypertension Follow up   Outpatient pressures: Ocassioanly checks at grocery stores, lower than todays reading, but she doesn't know the numbers  Current med regimen: Lisinopril 10 mg daily   Side effects of meds: No  How often miss meds: Never  Chest Pain/headache, changes in vision: No, No lower extremity edema  Diet: 0-2 vegetables/day. Trying to be low salt  Exercise/activity: working outside, treadmill/bike winter   Sleep:Good, 8 hours a night   Stress management: Nothing     BP Readings from Last 6 Encounters:   11/05/19 (!) 166/89   05/13/19 127/77   02/13/19 135/83   01/23/19 145/75     Hyperlipidemia   Switched from atorvastatin to rosuvastatin due to GI upset in February 2019.   Plan to recheck lipids today.   No GI upset on rosuvastatin           PMHX:     Patient Active Problem List   Diagnosis     Essential hypertension     Gastroesophageal reflux disease without esophagitis       Current Outpatient Medications   Medication Sig Dispense Refill     lisinopril (PRINIVIL/ZESTRIL) 10 MG tablet Take 1 tablet (10 mg) by mouth daily 90 tablet 1     omeprazole (PRILOSEC OTC) 20 MG EC tablet Take 1 tablet (20 mg) by mouth daily 90 tablet 1     rosuvastatin (CRESTOR) 5 MG tablet Take 1 tablet (5 mg) by mouth daily 90 tablet 1            Allergies   Allergen Reactions     No Known Allergies        No results found for this or any previous visit (from the past 24 hour(s)).         Review of Systems:     C: NEGATIVE for fatigue, unexpected change in weight  E: NEGATIVE for acute vision problems or changes  R: NEGATIVE for significant cough or shortness of breath  CV: NEGATIVE for chest pain, palpitations or new or worsening peripheral edema  P: NEGATIVE for changes in mood or affect          Physical Exam:     Vitals:    11/05/19 0757   BP: (!)  "166/89   Pulse: 73   Resp: 18   Temp: 98  F (36.7  C)   SpO2: 97%   Weight: 82.8 kg (182 lb 9.6 oz)   Height: 1.6 m (5' 3\")     Body mass index is 32.35 kg/m .    GENERAL APPEARANCE: healthy, alert and no distress,  RESP: lungs clear to auscultation - no rales, rhonchi or wheezes  CV: regular rate and rhythm,  and no murmur, click,  rub or gallop  Ext: no lower extremity edema, radial pulses 2+ BL  PSYCH: mood and affect normal/bright, speech normal rate and content, rational thought process      Assessment and Plan     1. Essential hypertension  Given multiple high BP readings today, will increase Lisinopril to 20 mg daily. Also encouraged her to check BP at home and keep a log, discussed goal is under 140/100 more than half the time. Will check BMP to ensure renal function is stable and K is wnl.   - Lipid Panel ([facility])  - Basic Metabolic Panel ([facility]) - Results < 1 hr  -Follow up in 2 weeks for recheck     2. Primary prevention of ASCVD  Patient preferred the lower dose of rosuvastatin, will recheck lipids today to ensure adequate response.     Options for treatment and follow-up care were reviewed with the patient and/or guardian. Nolvia Kimball and/or guardian engaged in the decision making process and verbalized understanding of the options discussed and agreed with the final plan.    Quintin Baez, MS3     I was present with the medical student who participated in the service and in the documentation of this note. I have verified the history and personally performed the physical exam and medical decision making, and have verified the content of the note, which accurately reflects my assessment of the patient and the plan of care.   Jazmin Huertas MD      "

## 2019-11-05 NOTE — PATIENT INSTRUCTIONS
We are increasing your lisinopril dose to 20mg daily.     Check your blood pressure at home once a week.     Come back in 2-4 weeks for a blood pressure check.    We checked your kidneys and cholesterol and we will call you with results.

## 2019-11-11 DIAGNOSIS — K21.9 GASTROESOPHAGEAL REFLUX DISEASE, ESOPHAGITIS PRESENCE NOT SPECIFIED: ICD-10-CM

## 2019-11-11 RX ORDER — OMEPRAZOLE 20 MG/1
20 TABLET, DELAYED RELEASE ORAL DAILY
Qty: 90 TABLET | Refills: 3 | Status: SHIPPED | OUTPATIENT
Start: 2019-11-11 | End: 2020-11-30

## 2019-12-03 ENCOUNTER — OFFICE VISIT (OUTPATIENT)
Dept: FAMILY MEDICINE | Facility: CLINIC | Age: 69
End: 2019-12-03
Payer: COMMERCIAL

## 2019-12-03 VITALS
HEART RATE: 100 BPM | SYSTOLIC BLOOD PRESSURE: 138 MMHG | WEIGHT: 179.4 LBS | TEMPERATURE: 98.7 F | OXYGEN SATURATION: 98 % | BODY MASS INDEX: 29.89 KG/M2 | HEIGHT: 65 IN | DIASTOLIC BLOOD PRESSURE: 87 MMHG | RESPIRATION RATE: 16 BRPM

## 2019-12-03 DIAGNOSIS — I10 ESSENTIAL HYPERTENSION: ICD-10-CM

## 2019-12-03 LAB
BUN SERPL-MCNC: 22 MG/DL (ref 7–30)
CALCIUM SERPL-MCNC: 9.7 MG/DL (ref 8.5–10.4)
CHLORIDE SERPLBLD-SCNC: 108 MMOL/L (ref 94–109)
CO2 SERPL-SCNC: 24 MMOL/L (ref 20–32)
CREAT SERPL-MCNC: 0.7 MG/DL (ref 0.6–1.3)
EGFR CALCULATED (BLACK REFERENCE): >90 ML/MIN
EGFR CALCULATED (NON BLACK REFERENCE): 88.7 ML/MIN
GLUCOSE SERPL-MCNC: 91 MG/DL (ref 60–109)
POTASSIUM SERPL-SCNC: 3.5 MMOL/L (ref 3.4–5.3)
SODIUM SERPL-SCNC: 145 MMOL/L (ref 133–144)

## 2019-12-03 RX ORDER — LISINOPRIL 20 MG/1
20 TABLET ORAL DAILY
Qty: 90 TABLET | Refills: 1 | Status: SHIPPED | OUTPATIENT
Start: 2019-12-03 | End: 2020-06-02

## 2019-12-03 ASSESSMENT — MIFFLIN-ST. JEOR: SCORE: 1342.75

## 2019-12-03 NOTE — PROGRESS NOTES
HPI:     Nolvia Kimball is a 69 year old female with PMH of essential hypertension and GERD who presents to follow up HTN.     Nolvia Kimball is here on her own.    Has been measuring home BPs since last visit and they have been 140s-150s/80s-90s on her home cuff.     She brought her home cuff today and it was 14 points higher systolic than our cuff and 11 points higher diastolic than our cuff. We confirmed this with two checks.     She mostly gets exercise by doing outdoor chores.     Denies chest pain, leg swelling, and shortness of breath.            PMHX:     Patient Active Problem List   Diagnosis     Essential hypertension     Gastroesophageal reflux disease without esophagitis       Current Outpatient Medications   Medication Sig Dispense Refill     lisinopril (PRINIVIL/ZESTRIL) 20 MG tablet Take 1 tablet (20 mg) by mouth daily 30 tablet 0     omeprazole (PRILOSEC OTC) 20 MG EC tablet Take 1 tablet (20 mg) by mouth daily 90 tablet 3     rosuvastatin (CRESTOR) 5 MG tablet Take 1 tablet (5 mg) by mouth daily 90 tablet 1     PREVNAR 13 SUSP injection   0     SHINGRIX injection   1       Social History     Tobacco Use     Smoking status: Former Smoker     Packs/day: 1.00     Years: 10.00     Pack years: 10.00     Types: Cigarettes     Smokeless tobacco: Never Used   Substance Use Topics     Alcohol use: Yes     Alcohol/week: 1.0 standard drinks     Types: 1 Shots of liquor per week     Frequency: 2-4 times a month     Drinks per session: 1 or 2     Drug use: No       Social History     Social History Narrative     Not on file       Allergies   Allergen Reactions     No Known Allergies        No results found for this or any previous visit (from the past 24 hour(s)).         Review of Systems:   7 point ROS negative except as noted.           Physical Exam:     Vitals:    12/03/19 1458   BP: 138/87   Pulse: 100   Resp: 16   Temp: 98.7  F (37.1  C)   TempSrc: Oral   SpO2: 98%   Weight: 81.4 kg (179 lb 6.4 oz)  "  Height: 1.64 m (5' 4.57\")     Body mass index is 30.26 kg/m .    General: Alert, well-appearing female in NAD  HEENT: PERRL, moist oral mucus membranes  Pulm: CTA BL, no tachypnea  CV: RRR, no murmur  Ext: Warm, well perfused, 2+ BL radial pulses, no LE edema  Skin: No rash on limited skin exam  Psych: Mood appropriate to visit content, full affect, rational thought content and process      Assessment and Plan     1. Essential hypertension  We checked BMP because last visit K was low and we increased her lisinopril dose. Based on our cuff here, BP now normal. She can continue to take BP at home but I advised her to adjust the numbers since it seems to be overestimating her pressures. BMP today shows normal K and just slightly high Na. Ok to not check sodium again since it's so slightly high. Needs BP follow up in 6 months.   - lisinopril (PRINIVIL/ZESTRIL) 20 MG tablet; Take 1 tablet (20 mg) by mouth daily  Dispense: 90 tablet; Refill: 1  - Basic Metabolic Panel ([facility]) - Results < 1 hr    2. Due for tetanus shot  --It's cheaper for her to get it at the pharmacy so she's going to go get it there.     There are no discontinued medications.    Options for treatment and follow-up care were reviewed with the patient and/or guardian. Nolvia Kimball and/or guardian engaged in the decision making process and verbalized understanding of the options discussed and agreed with the final plan.    Jazmin Huertas MD  [facility]   Pager: [phone]  "

## 2019-12-03 NOTE — RESULT ENCOUNTER NOTE
Could you call the patient with the following message? Thank you!    Dear Rochelle,    Your lab test came back and shows that your potassium level is normal now which is great. Your sodium level was just slightly high (145 but normal is up to 144) and I don't think you necessarily need to have this rechecked until your next blood pressure check.     Sincerely,  Dr. Jazmin Huertas

## 2019-12-04 NOTE — RESULT ENCOUNTER NOTE
Called and spoke to pt in regards to her lab results but pt states she saw results and [email or web address] --JESSICAer, CMA

## 2020-01-27 ENCOUNTER — OFFICE VISIT (OUTPATIENT)
Dept: FAMILY MEDICINE | Facility: CLINIC | Age: 70
End: 2020-01-27
Payer: COMMERCIAL

## 2020-01-27 VITALS
RESPIRATION RATE: 18 BRPM | SYSTOLIC BLOOD PRESSURE: 137 MMHG | WEIGHT: 175 LBS | TEMPERATURE: 98.2 F | HEART RATE: 77 BPM | OXYGEN SATURATION: 97 % | DIASTOLIC BLOOD PRESSURE: 88 MMHG | BODY MASS INDEX: 29.51 KG/M2

## 2020-01-27 DIAGNOSIS — F41.9 ANXIETY: Primary | ICD-10-CM

## 2020-01-27 RX ORDER — CITALOPRAM HYDROBROMIDE 10 MG/1
10 TABLET ORAL DAILY
Qty: 30 TABLET | Refills: 0 | Status: SHIPPED | OUTPATIENT
Start: 2020-01-27 | End: 2020-02-18

## 2020-01-27 ASSESSMENT — PATIENT HEALTH QUESTIONNAIRE - PHQ9
SUM OF ALL RESPONSES TO PHQ QUESTIONS 1-9: 6
5. POOR APPETITE OR OVEREATING: MORE THAN HALF THE DAYS

## 2020-01-27 ASSESSMENT — ANXIETY QUESTIONNAIRES
1. FEELING NERVOUS, ANXIOUS, OR ON EDGE: NEARLY EVERY DAY
3. WORRYING TOO MUCH ABOUT DIFFERENT THINGS: NEARLY EVERY DAY
6. BECOMING EASILY ANNOYED OR IRRITABLE: MORE THAN HALF THE DAYS
5. BEING SO RESTLESS THAT IT IS HARD TO SIT STILL: SEVERAL DAYS
GAD7 TOTAL SCORE: 17
7. FEELING AFRAID AS IF SOMETHING AWFUL MIGHT HAPPEN: NEARLY EVERY DAY
IF YOU CHECKED OFF ANY PROBLEMS ON THIS QUESTIONNAIRE, HOW DIFFICULT HAVE THESE PROBLEMS MADE IT FOR YOU TO DO YOUR WORK, TAKE CARE OF THINGS AT HOME, OR GET ALONG WITH OTHER PEOPLE: VERY DIFFICULT
2. NOT BEING ABLE TO STOP OR CONTROL WORRYING: NEARLY EVERY DAY

## 2020-01-27 NOTE — PROGRESS NOTES
HPI:     Nolvia Kimball is a 69 year old  female with PMH of HTN who presents with anxiety.    Nolvia Kimball is here on her own.     Concerned about anxiety- would like a medication for it.     Took GAD7 today and score is quite high (17).     She has always had anxiety about getting things done but it seems to be getting worse ever since her 's health problems worsened. He has diabetes now and they are working hard to keep that under control. She finds herself worrying all the time so much so that she can't enjoy any part of her day.     She doesn't have trouble falling asleep. Actually she feels she sleeps too much.     She thinks winter makes things worse- she's not really exercising at all and she feels bored by lack of activity. She also is in charge of all the chores at their house because her  can't do them. 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     Is not having any thoughts of self harm. Her anxiety is very bothersome but for the most part it's not interfering with her getting things done- she still does her errands, gets her to do list done, etc. She just has a lack of enjoyment in life overall.     PTSD screen negative.     Will do her tetanus shot at [facility].         PMHX:     Patient Active Problem List   Diagnosis     Essential hypertension     Gastroesophageal reflux disease without esophagitis       Current Outpatient Medications   Medication Sig Dispense Refill     lisinopril (PRINIVIL/ZESTRIL) 20 MG tablet Take 1 tablet (20 mg) by mouth daily 90 tablet 1     omeprazole (PRILOSEC OTC) 20 MG EC tablet Take 1 tablet (20 mg) by mouth daily 90 tablet 3     rosuvastatin (CRESTOR) 5 MG tablet Take 1 tablet (5 mg) by mouth daily 90 tablet 1     PREVNAR 13 SUSP injection   0     SHINGRIX injection   1       Social History     Tobacco Use     Smoking status: Former Smoker     Packs/day: 1.00     Years: 10.00     Pack years: 10.00     Types: Cigarettes     Smokeless tobacco: Never Used   Substance Use  Topics     Alcohol use: Yes     Alcohol/week: 1.0 standard drinks     Types: 1 Shots of liquor per week     Frequency: 2-4 times a month     Drinks per session: 1 or 2     Drug use: No       Social History     Social History Narrative     Not on file       Allergies   Allergen Reactions     No Known Allergies        No results found for this or any previous visit (from the past 24 hour(s)).         Review of Systems:   7 point ROS negative except as noted.           Physical Exam:     Vitals:    01/27/20 0804   BP: 137/88   Pulse: 77   Resp: 18   Temp: 98.2  F (36.8  C)   TempSrc: Oral   SpO2: 97%   Weight: 79.4 kg (175 lb)     Body mass index is 29.51 kg/m .    General: Alert, well-appearing female in NAD  HEENT: PERRL, moist oral mucus membranes  Ext: Warm, well perfused  Skin: No rash on limited skin exam  Psych: Mood appropriate to visit content, full affect, rational thought content and process, no delusions, no hallucinations, no suicidal ideation, normal grooming, normal rate and content of speech, Excellent insight and judgement.       Assessment and Plan     1. Anxiety  Diagnosis most likely generalized anxiety disorder as patient describes a lifetime tendency towards anxiety that has acutely worsened. She is not having symptoms of panic disorder. We discussed starting celexa and patient is agreeable. Discussed possible side effects and the length of time typically needed for effect to take place. Also discussed lifestyle changes to help with anxiety including incorporating exercise, taking time for herself.   - citalopram (CELEXA) 10 MG tablet; Take 1 tablet (10 mg) by mouth daily  Dispense: 30 tablet; Refill: 0  - RTC 3 weeks for medication check     2. [facility] [facility] [place] [facility]    3. HTN- BP well controlled on current regimen    There are no discontinued medications.    Options for treatment and follow-up care were reviewed with the patient and/or guardian. Nolvia Kimball and/or guardian  engaged in the decision making process and verbalized understanding of the options discussed and agreed with the final plan.    Jazmin Huertas MD  [facility]   Pager: [phone]

## 2020-01-28 ASSESSMENT — ANXIETY QUESTIONNAIRES: GAD7 TOTAL SCORE: 17

## 2020-02-17 DIAGNOSIS — F41.9 ANXIETY: ICD-10-CM

## 2020-02-18 RX ORDER — CITALOPRAM HYDROBROMIDE 10 MG/1
10 TABLET ORAL DAILY
Qty: 30 TABLET | Refills: 0 | Status: SHIPPED | OUTPATIENT
Start: 2020-02-18 | End: 2020-02-24

## 2020-02-24 ENCOUNTER — OFFICE VISIT (OUTPATIENT)
Dept: FAMILY MEDICINE | Facility: CLINIC | Age: 70
End: 2020-02-24
Payer: COMMERCIAL

## 2020-02-24 VITALS
TEMPERATURE: 98.2 F | SYSTOLIC BLOOD PRESSURE: 121 MMHG | HEART RATE: 85 BPM | RESPIRATION RATE: 16 BRPM | BODY MASS INDEX: 28.67 KG/M2 | OXYGEN SATURATION: 95 % | WEIGHT: 170 LBS | DIASTOLIC BLOOD PRESSURE: 74 MMHG

## 2020-02-24 DIAGNOSIS — I10 ESSENTIAL HYPERTENSION: Primary | Chronic | ICD-10-CM

## 2020-02-24 DIAGNOSIS — F41.9 ANXIETY: ICD-10-CM

## 2020-02-24 RX ORDER — TETANUS TOXOID, REDUCED DIPHTHERIA TOXOID AND ACELLULAR PERTUSSIS VACCINE, ADSORBED 5; 2.5; 8; 8; 2.5 [IU]/.5ML; [IU]/.5ML; UG/.5ML; UG/.5ML; UG/.5ML
SUSPENSION INTRAMUSCULAR
COMMUNITY
Start: 2020-01-30

## 2020-02-24 RX ORDER — CITALOPRAM HYDROBROMIDE 10 MG/1
10 TABLET ORAL DAILY
Qty: 90 TABLET | Refills: 3 | Status: SHIPPED | OUTPATIENT
Start: 2020-02-24 | End: 2021-03-22

## 2020-02-24 ASSESSMENT — ANXIETY QUESTIONNAIRES
2. NOT BEING ABLE TO STOP OR CONTROL WORRYING: NOT AT ALL
6. BECOMING EASILY ANNOYED OR IRRITABLE: NOT AT ALL
IF YOU CHECKED OFF ANY PROBLEMS ON THIS QUESTIONNAIRE, HOW DIFFICULT HAVE THESE PROBLEMS MADE IT FOR YOU TO DO YOUR WORK, TAKE CARE OF THINGS AT HOME, OR GET ALONG WITH OTHER PEOPLE: NOT DIFFICULT AT ALL
5. BEING SO RESTLESS THAT IT IS HARD TO SIT STILL: NOT AT ALL
3. WORRYING TOO MUCH ABOUT DIFFERENT THINGS: SEVERAL DAYS
7. FEELING AFRAID AS IF SOMETHING AWFUL MIGHT HAPPEN: NOT AT ALL
1. FEELING NERVOUS, ANXIOUS, OR ON EDGE: NOT AT ALL
GAD7 TOTAL SCORE: 1

## 2020-02-24 ASSESSMENT — PATIENT HEALTH QUESTIONNAIRE - PHQ9
5. POOR APPETITE OR OVEREATING: NOT AT ALL
SUM OF ALL RESPONSES TO PHQ QUESTIONS 1-9: 0

## 2020-02-24 NOTE — PROGRESS NOTES
"       HPI:     Nolvia Kimball is a 69 year old  female with PMH of HTN and anxiety who presents to follow up mood.     Nolvia Kimball is here on her own.     She reports her mood is \"great.\" She thinks this is multifactorial- the weather is getting nicer and there's more daylight every day which she feels helps. Her  Luma's health is also doing much better. He was diagnosed with diabetes and his sugars have been well controlled and they have been making lots of positive dietary changes at home. She still hasn't started an exercise routine so that is her next goal.     She believes that celexa is helping too and she would like to stay on the medicine.     No trouble taking her lisinopril, no side effects. Denies chest pain and shortness of breath.     Got her pneumococcal and shingles vaccines at the pharmacy.     PHQ-9 score:    PHQ 2/24/2020   PHQ-9 Total Score 0   Q9: Thoughts of better off dead/self-harm past 2 weeks Not at all              PMHX:     Patient Active Problem List   Diagnosis     Essential hypertension     Gastroesophageal reflux disease without esophagitis       Current Outpatient Medications   Medication Sig Dispense Refill     citalopram (CELEXA) 10 MG tablet Take 1 tablet (10 mg) by mouth daily 30 tablet 0     lisinopril (PRINIVIL/ZESTRIL) 20 MG tablet Take 1 tablet (20 mg) by mouth daily 90 tablet 1     omeprazole (PRILOSEC OTC) 20 MG EC tablet Take 1 tablet (20 mg) by mouth daily 90 tablet 3     rosuvastatin (CRESTOR) 5 MG tablet Take 1 tablet (5 mg) by mouth daily 90 tablet 1     PREVNAR 13 SUSP injection   0     SHINGRIX injection   1       Social History     Tobacco Use     Smoking status: Former Smoker     Packs/day: 1.00     Years: 10.00     Pack years: 10.00     Types: Cigarettes     Smokeless tobacco: Never Used   Substance Use Topics     Alcohol use: Yes     Alcohol/week: 1.0 standard drinks     Types: 1 Shots of liquor per week     Frequency: 2-4 times a month     Drinks per " session: 1 or 2     Drug use: No       Social History     Social History Narrative     Not on file       Allergies   Allergen Reactions     No Known Allergies        No results found for this or any previous visit (from the past 24 hour(s)).         Review of Systems:   7 point ROS negative except as noted.           Physical Exam:     Vitals:    02/24/20 1324   BP: 121/74   Pulse: 85   Resp: 16   Temp: 98.2  F (36.8  C)   TempSrc: Oral   SpO2: 95%   Weight: 77.1 kg (170 lb)     Body mass index is 28.67 kg/m .    General: Alert, well-appearing female in NAD  HEENT: PERRL, moist oral mucus membranes  Pulm: CTA BL, no tachypnea  CV: RRR, no murmur  Ext: Warm, well perfused, 2+ BL radial pulses, no LE edema  Skin: No rash on limited skin exam  Psych: Mood bright, full affect, rational thought content and process, normal grooming and dress, normal rate and content of speech, no delusions or hallucinations, no suicidal ideation, good eye contact, very good insight and judgement      Assessment and Plan     1. Anxiety  Very well controlled. We discussed continuing celexa for now and then having another discussion in 6 months to 1 year about whether she'd like to come off the medication.   - citalopram (CELEXA) 10 MG tablet; Take 1 tablet (10 mg) by mouth daily  Dispense: 90 tablet; Refill: 3    2. Essential hypertension  Well controlled, continue lisinopril. Not due for BMP until June.       There are no discontinued medications.    Options for treatment and follow-up care were reviewed with the patient and/or guardian. Nolvia Kimball and/or guardian engaged in the decision making process and verbalized understanding of the options discussed and agreed with the final plan.    Jazmin Huertas MD  [facility]   Pager: [phone]

## 2020-02-25 ASSESSMENT — ANXIETY QUESTIONNAIRES: GAD7 TOTAL SCORE: 1

## 2020-03-11 ENCOUNTER — OFFICE VISIT (OUTPATIENT)
Dept: FAMILY MEDICINE | Facility: CLINIC | Age: 70
End: 2020-03-11
Payer: COMMERCIAL

## 2020-03-11 ENCOUNTER — HEALTH MAINTENANCE LETTER (OUTPATIENT)
Age: 70
End: 2020-03-11

## 2020-03-11 VITALS
RESPIRATION RATE: 18 BRPM | OXYGEN SATURATION: 96 % | SYSTOLIC BLOOD PRESSURE: 123 MMHG | WEIGHT: 169 LBS | HEIGHT: 64 IN | HEART RATE: 77 BPM | BODY MASS INDEX: 28.85 KG/M2 | DIASTOLIC BLOOD PRESSURE: 83 MMHG | TEMPERATURE: 97.7 F

## 2020-03-11 DIAGNOSIS — M17.11 ARTHRITIS OF RIGHT KNEE: Primary | ICD-10-CM

## 2020-03-11 RX ORDER — TRIAMCINOLONE ACETONIDE 40 MG/ML
40 INJECTION, SUSPENSION INTRA-ARTICULAR; INTRAMUSCULAR ONCE
Status: COMPLETED | OUTPATIENT
Start: 2020-03-11 | End: 2020-03-11

## 2020-03-11 RX ADMIN — TRIAMCINOLONE ACETONIDE 40 MG: 40 INJECTION, SUSPENSION INTRA-ARTICULAR; INTRAMUSCULAR at 08:50

## 2020-03-11 ASSESSMENT — MIFFLIN-ST. JEOR: SCORE: 1286.58

## 2020-03-11 NOTE — PROGRESS NOTES
HPI:     Nolvia Kimball is a 69 year old female with PMH of essential hypertension who presents with right knee pain.     Nolvia Kimball is here alone.     Last May Rochelle hurt her left knee while pushing a wheelbarrow full of mulch up the hill and she was seen in the ED for it and had normal xray there, was given a brace. Ended up getting referred to [facility] who did a steroid injection in the left knee and she states this helped immensely. She things she was told at the time she had a small effusion. She did do a couple sessions with PT as well.     She thinks maybe as she was recovering with her left knee being injured she may have irritated her right due to compensating on that side. Now her right knee bothers her- she notices if she sits for a long time with it bent then it is stiff/painful when she tries to straighten it and vice versa if she has it straight for too long it's stiff and painful to bend. She thinks that the way it is now it's going to be hard for her to garden this spring and she loves to garden and be in the yard.     No clicking of the knee, no swelling or color change, no instability. No trauma to the knee.     She tells me that she thinks about 7 years ago at [facility] she had xrays of both knees and was told she had arthritis at that time. I can't see those records today even though we did connect to care everywhere.          PMHX:     Patient Active Problem List   Diagnosis     Essential hypertension     Gastroesophageal reflux disease without esophagitis       Current Outpatient Medications   Medication Sig Dispense Refill     BOOSTRIX 5-2.5-18.5 LF-MCG/0.5 injection        citalopram (CELEXA) 10 MG tablet Take 1 tablet (10 mg) by mouth daily 90 tablet 3     lisinopril (PRINIVIL/ZESTRIL) 20 MG tablet Take 1 tablet (20 mg) by mouth daily 90 tablet 1     omeprazole (PRILOSEC OTC) 20 MG EC tablet Take 1 tablet (20 mg) by mouth daily 90 tablet 3     PREVNAR 13 SUSP injection    "0     rosuvastatin (CRESTOR) 5 MG tablet Take 1 tablet (5 mg) by mouth daily 90 tablet 1     SHINGRIX injection   1       Social History     Tobacco Use     Smoking status: Former Smoker     Packs/day: 1.00     Years: 10.00     Pack years: 10.00     Types: Cigarettes     Smokeless tobacco: Never Used   Substance Use Topics     Alcohol use: Yes     Alcohol/week: 1.0 standard drinks     Types: 1 Shots of liquor per week     Frequency: 2-4 times a month     Drinks per session: 1 or 2     Drug use: No       Social History     Social History Narrative     Not on file       Allergies   Allergen Reactions     No Known Allergies        No results found for this or any previous visit (from the past 24 hour(s)).         Review of Systems:   7 point ROS negative except as noted.           Physical Exam:     Vitals:    03/11/20 0820   BP: 123/83   Pulse: 77   Resp: 18   Temp: 97.7  F (36.5  C)   TempSrc: Oral   SpO2: 96%   Weight: 76.7 kg (169 lb)   Height: 1.626 m (5' 4\")     Body mass index is 29.01 kg/m .    General: Alert, well-appearing female in NAD  Ext: Warm, well perfused, 2+ BL radial pulses, no LE edema  Skin: No rash on limited skin exam  Psych: Mood appropriate to visit content, full affect, rational thought content and process    Left Knee: Inspection reveals normal alignment, no effusion, no ecchymosis. Palpation reveals no pain along joint line, patellar tendon, or patellar bursa. Normal flexion and extension range of motion. Strength testing reveals 5/5 knee flexion and extension, dorsi and plantar flexion. Sensation to soft touch is intact.     Right Knee: Inspection reveals normal alignment, no effusion, no ecchymosis. Palpation reveals no pain along joint line, patellar tendon, or patellar bursa. Normal extension range of motion but limited flexion ROM. Strength testing reveals 5/5 knee flexion and extension, dorsi and plantar flexion. Sensation to soft touch is intact. Negative posterior drawer and " Lachman's. Normal end point with valgus and varus stress.     PROCEDURE:  JOINT ASPIRATION/INJECTION  After a discussion of risks, benefits and side effects of procedure, informed patient consent was obtained.  The right knee was prepped and draped in the usual clean fashion (sterile not required for this procedure).      ASPIRATION: Not performed.    INJECTION:  Using 2 cc of 2 % lidocaine mixed with 40 mg of kenalog, the right knee was successfully injected without complication.  Patient did experience some pain relief following injection.    Invasive Procedure Safety Checklist completed by MD? Yes    Assessment and Plan     1. Arthritis of right knee  Discussed the possibility of doing xray today but we opted not to since she reports past xray showing arthritis in the knee. Her exam is consistent with arthritis. There is no ligamentous laxity, no effusion, and strength is normal. Patient requests steroid injection for this and I think that is reasonable. Offered PT referral as well and patient would like to hold off on that. She's open to it in the future if injection not effective.   - Large Joint/Bursa injection and/or drainage - Unilateral (Shoulder, Knee) [20610]  - triamcinolone (KENALOG-40) injection 40 mg    There are no discontinued medications.    Options for treatment and follow-up care were reviewed with the patient and/or guardian. Nolvia Kimball and/or guardian engaged in the decision making process and verbalized understanding of the options discussed and agreed with the final plan.    Jazmin Huertas MD  [facility]   Pager: [phone]

## 2020-03-30 DIAGNOSIS — I10 ESSENTIAL HYPERTENSION: Chronic | ICD-10-CM

## 2020-03-30 RX ORDER — ROSUVASTATIN CALCIUM 5 MG/1
5 TABLET, COATED ORAL DAILY
Qty: 90 TABLET | Refills: 3 | Status: SHIPPED | OUTPATIENT
Start: 2020-03-30 | End: 2021-03-22

## 2020-06-01 DIAGNOSIS — I10 ESSENTIAL HYPERTENSION: ICD-10-CM

## 2020-06-02 RX ORDER — LISINOPRIL 20 MG/1
20 TABLET ORAL DAILY
Qty: 90 TABLET | Refills: 1 | Status: SHIPPED | OUTPATIENT
Start: 2020-06-02 | End: 2020-12-16

## 2020-06-19 ENCOUNTER — OFFICE VISIT (OUTPATIENT)
Dept: FAMILY MEDICINE | Facility: CLINIC | Age: 70
End: 2020-06-19
Payer: COMMERCIAL

## 2020-06-19 VITALS
SYSTOLIC BLOOD PRESSURE: 110 MMHG | HEART RATE: 71 BPM | TEMPERATURE: 97.3 F | BODY MASS INDEX: 27.98 KG/M2 | RESPIRATION RATE: 16 BRPM | OXYGEN SATURATION: 98 % | WEIGHT: 163 LBS | DIASTOLIC BLOOD PRESSURE: 72 MMHG

## 2020-06-19 DIAGNOSIS — M17.11 ARTHRITIS OF RIGHT KNEE: Primary | ICD-10-CM

## 2020-06-19 RX ORDER — TRIAMCINOLONE ACETONIDE 40 MG/ML
40 INJECTION, SUSPENSION INTRA-ARTICULAR; INTRAMUSCULAR ONCE
Status: COMPLETED | OUTPATIENT
Start: 2020-06-19 | End: 2020-06-19

## 2020-06-19 RX ADMIN — TRIAMCINOLONE ACETONIDE 40 MG: 40 INJECTION, SUSPENSION INTRA-ARTICULAR; INTRAMUSCULAR at 11:01

## 2020-06-19 NOTE — PROGRESS NOTES
Knee Injection Note    Nolvia Kimball is a patient of Jazmin Marroquin.    We did right knee injection with kenalog in March and after that Rochelle felt complete relief, was able to be much more active, and she has been doing home exercises for arthritis that she got from a PT (Formal PT couldn't be started due to covid).     She and I talked through risks and benefits of trying another steroid injection and she has decided to proceed with another injection.     Our plan is to continue with kenalog injections since the first one helped so much and eventually we discussed she could see orthopedic surgery for consideration of knee replacement.     /72   Pulse 71   Temp 97.3  F (36.3  C) (Oral)   Resp 16   Wt 73.9 kg (163 lb)   SpO2 98%   BMI 27.98 kg/m      Patient is alert, not in distress, ambulates without limp.  Right knee exam shows no effusion or deformity, limited flexion ROM prior to procedure, no joint line tenderness. After procedure her flexion ROM immediately improved.     Consent: Affirmation of informed consent was signed and scanned into the medical record. Risks, benefits and alternatives were discussed. Patient's questions were elicited and answered.   Procedure safety checklist was completed:  Yes  Time Out (Pause for the Cause) completed: Yes    The right knee was prepped  in the usual fashion.    INJECTION:    Using 2 cc of 1% lidocaine mixed with 40 mg of Kenalog, the knee joint was successfully injected  without complication.  Patient experienced 50% relief of pain following injection.  Bandaid applied.  Routine care discussed.    Was entire vial of medication used? Yes    Follow up: Patient was instructed to call if severe pain, redness, warmth or other concerns      Jazmin Huertas MD

## 2020-09-16 ENCOUNTER — OFFICE VISIT (OUTPATIENT)
Dept: FAMILY MEDICINE | Facility: CLINIC | Age: 70
End: 2020-09-16
Payer: COMMERCIAL

## 2020-09-16 VITALS
HEIGHT: 64 IN | HEART RATE: 74 BPM | DIASTOLIC BLOOD PRESSURE: 77 MMHG | SYSTOLIC BLOOD PRESSURE: 122 MMHG | RESPIRATION RATE: 16 BRPM | WEIGHT: 164.8 LBS | OXYGEN SATURATION: 97 % | BODY MASS INDEX: 28.13 KG/M2 | TEMPERATURE: 97.9 F

## 2020-09-16 DIAGNOSIS — M17.11 ARTHRITIS OF RIGHT KNEE: Primary | ICD-10-CM

## 2020-09-16 RX ORDER — TRIAMCINOLONE ACETONIDE 40 MG/ML
40 INJECTION, SUSPENSION INTRA-ARTICULAR; INTRAMUSCULAR ONCE
Status: COMPLETED | OUTPATIENT
Start: 2020-09-16 | End: 2020-09-16

## 2020-09-16 RX ADMIN — TRIAMCINOLONE ACETONIDE 40 MG: 40 INJECTION, SUSPENSION INTRA-ARTICULAR; INTRAMUSCULAR at 17:41

## 2020-09-16 ASSESSMENT — MIFFLIN-ST. JEOR: SCORE: 1265.28

## 2020-09-16 NOTE — PROGRESS NOTES
HPI:       Nolvia Kimball is a 70 year old  female with a significant past medical history of OA who presents for follow up of concern(s) listed below    1) knee injection?  -will be 3rd injection this year  -previous helped a lot  -Last one early in June 2020  -pretty physically active -> gardening, cleaning         PMHX:     Patient Active Problem List   Diagnosis     Essential hypertension     Gastroesophageal reflux disease without esophagitis       Past Surgical History:   Procedure Laterality Date     NO HISTORY OF SURGERY         Current Outpatient Medications   Medication Sig Dispense Refill     BOOSTRIX 5-2.5-18.5 LF-MCG/0.5 injection        citalopram (CELEXA) 10 MG tablet Take 1 tablet (10 mg) by mouth daily 90 tablet 3     lisinopril (ZESTRIL) 20 MG tablet Take 1 tablet (20 mg) by mouth daily 90 tablet 1     omeprazole (PRILOSEC OTC) 20 MG EC tablet Take 1 tablet (20 mg) by mouth daily 90 tablet 3     PREVNAR 13 SUSP injection   0     rosuvastatin (CRESTOR) 5 MG tablet Take 1 tablet (5 mg) by mouth daily 90 tablet 3     SHINGRIX injection   1          Allergies   Allergen Reactions     No Known Allergies        Social History     Socioeconomic History     Marital status:      Spouse name: Luma     Number of children: Not on file     Years of education: 12     Highest education level: Not on file   Occupational History     Not on file   Social Needs     Financial resource strain: Not on file     Food insecurity     Worry: Not on file     Inability: Not on file     Transportation needs     Medical: Not on file     Non-medical: Not on file   Tobacco Use     Smoking status: Former Smoker     Packs/day: 1.00     Years: 10.00     Pack years: 10.00     Types: Cigarettes     Smokeless tobacco: Never Used   Substance and Sexual Activity     Alcohol use: Yes     Alcohol/week: 1.0 standard drinks     Types: 1 Shots of liquor per week     Frequency: 2-4 times a month     Drinks per session: 1 or 2      "Drug use: No     Sexual activity: Not on file   Lifestyle     Physical activity     Days per week: Not on file     Minutes per session: Not on file     Stress: Not on file   Relationships     Social connections     Talks on phone: Not on file     Gets together: Not on file     Attends [employer] service: Not on file     Active member of club or organization: Not on file     Attends meetings of clubs or organizations: Not on file     Relationship status: Not on file     Intimate partner violence     Fear of current or ex partner: Not on file     Emotionally abused: Not on file     Physically abused: Not on file     Forced sexual activity: Not on file   Other Topics Concern     Not on file   Social History Narrative     Not on file              Review of Systems:     5-point ROS reviewed and negative unless otherwise noted in HPI          Physical Exam:     Vitals:    09/16/20 1533   BP: 122/77   Pulse: 74   Resp: 16   Temp: 97.9  F (36.6  C)   TempSrc: Oral   SpO2: 97%   Weight: 74.8 kg (164 lb 12.8 oz)   Height: 1.63 m (5' 4.17\")     Body mass index is 28.14 kg/m .    GENERAL: healthy, alert and no distress  EYES: Eyes grossly normal to inspection  HENT: nose and mouth without ulcers or lesions  NECK: no asymmetry, masses, or scars  RESP: breathing and speaking comfortably, normal RR  CV: acyanotic  MS: extremities normal- no gross deformities noted  SKIN: no suspicious lesions or rashes  NEURO: mentation intact, speech normal and A&Ox3  PSYCH: affect/mood appropriate    Assessment and Plan     (M17.11) Arthritis of right knee  (primary encounter diagnosis)  Comment: Worsened, subacute-on-chronic.  VS WNL.  Discussed risks (infection, bleeding, pain, nerve damage), benefits, and alternatives (PT, PO meds, surgery).  Provided steroid injection - see separate procedure note.    Plan:   -triamcinolone (KENALOG-40) injection 40 mg  -Hold off on another injection in R knee for another 4-6 months    Options for treatment " and follow-up care were reviewed with the patient and/or guardian. Nolvia Ramirokate and/or guardian engaged in the decision making process and verbalized understanding of the options discussed and agreed with the final plan.      Siva Carrillo MD    Precepted today with: Jovani Reagan MD

## 2020-09-18 NOTE — PROCEDURES
Date: 9/16/20     Performed by: Siva LYNNE), Jaden Jackson (MS4)  Supervised by: Dr. Reagan     Procedure: Knee injection  Consent: verbal and written obtained  -Discussed steps of procedure  -Discussed risks and benefits  -Discussed alternatives to this procedure  -Patient demonstrated understanding of the above     Procedure: Patient was placed in a sitting position for the procedure.  The area of injection of was identified by examination of the right knee.  The area was cleaned with alcohol wipes.  5-mL of xylocaine 1% without epinephrine and Kenalog 40mg was drawn up into a syringe.  An inferior, lateral approach was taken, and the needle was easily inserted into the intra-articular space.  The medication was easily injected into this space.  Patient tolerated the procedure well.  A band-aid was applied.

## 2020-09-22 NOTE — PROGRESS NOTES
Preceptor Attestation:   Patient seen, evaluated and discussed with the resident. I was present for and supervised the entire procedure. I have verified the content of the note, which accurately reflects my assessment of the patient and the plan of care.  Supervising Physician:Jovani Reagan MD  [facility]

## 2020-11-30 DIAGNOSIS — K21.9 GASTROESOPHAGEAL REFLUX DISEASE WITHOUT ESOPHAGITIS: Primary | ICD-10-CM

## 2020-12-01 RX ORDER — OMEPRAZOLE 20 MG/1
20 TABLET, DELAYED RELEASE ORAL DAILY
Qty: 90 TABLET | Refills: 1 | Status: SHIPPED | OUTPATIENT
Start: 2020-12-01 | End: 2021-06-07

## 2020-12-16 DIAGNOSIS — I10 ESSENTIAL HYPERTENSION: ICD-10-CM

## 2020-12-16 RX ORDER — LISINOPRIL 20 MG/1
20 TABLET ORAL DAILY
Qty: 90 TABLET | Refills: 0 | Status: SHIPPED | OUTPATIENT
Start: 2020-12-16 | End: 2021-05-21

## 2020-12-17 NOTE — TELEPHONE ENCOUNTER
Could you call to let Rochelle know that we are due to check her kidney function sometime in the next 3 months? Would be good to do in the context of a blood pressure visit.    Thank you!    Jamzin Huertas MD  Family Medicine

## 2021-01-03 ENCOUNTER — HEALTH MAINTENANCE LETTER (OUTPATIENT)
Age: 71
End: 2021-01-03

## 2021-01-06 ENCOUNTER — OFFICE VISIT (OUTPATIENT)
Dept: FAMILY MEDICINE | Facility: CLINIC | Age: 71
End: 2021-01-06
Payer: COMMERCIAL

## 2021-01-06 VITALS
BODY MASS INDEX: 29.64 KG/M2 | SYSTOLIC BLOOD PRESSURE: 127 MMHG | WEIGHT: 173.6 LBS | OXYGEN SATURATION: 95 % | DIASTOLIC BLOOD PRESSURE: 82 MMHG | HEART RATE: 81 BPM | HEIGHT: 64 IN | RESPIRATION RATE: 20 BRPM | TEMPERATURE: 97.8 F

## 2021-01-06 DIAGNOSIS — M17.11 ARTHRITIS OF RIGHT KNEE: ICD-10-CM

## 2021-01-06 DIAGNOSIS — I10 ESSENTIAL HYPERTENSION: Primary | Chronic | ICD-10-CM

## 2021-01-06 LAB
BUN SERPL-MCNC: 22 MG/DL (ref 7–30)
CALCIUM SERPL-MCNC: 9.2 MG/DL (ref 8.5–10.4)
CHLORIDE SERPLBLD-SCNC: 110 MMOL/L (ref 94–109)
CO2 SERPL-SCNC: 24 MMOL/L (ref 20–32)
CREAT SERPL-MCNC: 0.8 MG/DL (ref 0.6–1.3)
EGFR CALCULATED (BLACK REFERENCE): >90 ML/MIN
EGFR CALCULATED (NON BLACK REFERENCE): 75.8 ML/MIN
GLUCOSE SERPL-MCNC: 96 MG/DL (ref 60–109)
POTASSIUM SERPL-SCNC: 3.9 MMOL/L (ref 3.4–5.3)
SODIUM SERPL-SCNC: 141 MMOL/L (ref 133–144)

## 2021-01-06 PROCEDURE — 80048 BASIC METABOLIC PNL TOTAL CA: CPT | Performed by: STUDENT IN AN ORGANIZED HEALTH CARE EDUCATION/TRAINING PROGRAM

## 2021-01-06 PROCEDURE — 36415 COLL VENOUS BLD VENIPUNCTURE: CPT | Performed by: STUDENT IN AN ORGANIZED HEALTH CARE EDUCATION/TRAINING PROGRAM

## 2021-01-06 PROCEDURE — 99214 OFFICE O/P EST MOD 30 MIN: CPT | Mod: 25 | Performed by: STUDENT IN AN ORGANIZED HEALTH CARE EDUCATION/TRAINING PROGRAM

## 2021-01-06 PROCEDURE — 20610 DRAIN/INJ JOINT/BURSA W/O US: CPT | Performed by: STUDENT IN AN ORGANIZED HEALTH CARE EDUCATION/TRAINING PROGRAM

## 2021-01-06 RX ORDER — TRIAMCINOLONE ACETONIDE 40 MG/ML
40 INJECTION, SUSPENSION INTRA-ARTICULAR; INTRAMUSCULAR ONCE
Status: COMPLETED | OUTPATIENT
Start: 2021-01-06 | End: 2021-01-06

## 2021-01-06 RX ADMIN — TRIAMCINOLONE ACETONIDE 40 MG: 40 INJECTION, SUSPENSION INTRA-ARTICULAR; INTRAMUSCULAR at 09:01

## 2021-01-06 ASSESSMENT — MIFFLIN-ST. JEOR: SCORE: 1302.44

## 2021-01-06 NOTE — PROGRESS NOTES
HPI:     Nolvia Kimball is a 70 year old  female with PMH of HTN who presents to follow up HTN.    Nolvia Kimball is here alone.     HTN:  Takes lisinopril 20mg daily, No trouble with it.  NSAID use: None  Exercise: Active around the house  Diet: She and her  have been on a healthy eating routine for his diabetes.       Mood:  Covid really affected their Dayhoit celebrations and that was very disappointing to her but mood is ok- she feels celexa is helping. Wants to continue same dose.     She had knee injection with Dr. Carrillo in September and relief only lasted for 2 weeks after that shot. Previously she has had months of relief after kenalog injections. She has trouble doing daily things like going up and down stairs. Can't flex the right knee very far. Feels like she's ready to talk to ortho about possible knee replacement.            PMHX:     Patient Active Problem List   Diagnosis     Essential hypertension     Gastroesophageal reflux disease without esophagitis       Current Outpatient Medications   Medication Sig Dispense Refill     citalopram (CELEXA) 10 MG tablet Take 1 tablet (10 mg) by mouth daily 90 tablet 3     lisinopril (ZESTRIL) 20 MG tablet Take 1 tablet (20 mg) by mouth daily 90 tablet 0     omeprazole (PRILOSEC OTC) 20 MG EC tablet Take 1 tablet (20 mg) by mouth daily 90 tablet 1     rosuvastatin (CRESTOR) 5 MG tablet Take 1 tablet (5 mg) by mouth daily 90 tablet 3     BOOSTRIX 5-2.5-18.5 LF-MCG/0.5 injection        PREVNAR 13 SUSP injection   0     SHINGRIX injection   1       Social History     Tobacco Use     Smoking status: Former Smoker     Packs/day: 1.00     Years: 10.00     Pack years: 10.00     Types: Cigarettes     Smokeless tobacco: Never Used   Substance Use Topics     Alcohol use: Yes     Alcohol/week: 1.0 standard drinks     Types: 1 Shots of liquor per week     Frequency: 2-4 times a month     Drinks per session: 1 or 2     Drug use: No       Social History  "    Social History Narrative     Not on file       Allergies   Allergen Reactions     No Known Allergies        No results found for this or any previous visit (from the past 24 hour(s)).         Review of Systems:   7 point ROS negative except as noted.           Physical Exam:     Vitals:    01/06/21 0806 01/06/21 0809   BP: (!) 143/81 127/82   Pulse: 81    Resp: 20    Temp: 97.8  F (36.6  C)    SpO2: 95%    Weight: 78.7 kg (173 lb 9.6 oz)    Height: 1.626 m (5' 4\")      Body mass index is 29.8 kg/m .    General: Alert, well-appearing female in NAD  HEENT: PERRL  Pulm: CTA BL, no tachypnea  CV: RRR, no murmur  Ext: Warm, well perfused, 2+ BL radial pulses, no LE edema. Right knee is quite limited in flexion- flexes just past 90 deg. No effusion.  Skin: No rash on limited skin exam  Psych: Mood appropriate to visit content, full affect, rational thought content and process      Assessment and Plan     1. Essential hypertension  Well controlled with current dose of lisinopril. Continue 20mg daily. BMP today is normal.  - Basic Metabolic Panel ([facility]) - Results < 1 hr    2. Right knee arthritis   Injection performed today which is 4 months after last injection. She only got two weeks relief with last injection and so we discussed referral to ortho to discuss possibility of knee replacement in the future.   --Ortho referral      There are no discontinued medications.    Options for treatment and follow-up care were reviewed with the patient and/or guardian. Nolvia Kimball and/or guardian engaged in the decision making process and verbalized understanding of the options discussed and agreed with the final plan.        PROCEDURE:  JOINT ASPIRATION/INJECTION    After a discussion of risks, benefits and side effects of procedure, informed patient verbal consent was obtained.  The right knee was prepped with betadine.     INJECTION:  Using 1.5 cc of 2 % lidocaine mixed with 40 mg of kenalog, the right knee was " successfully injected without complication.  Patient did experience some pain relief following injection.      Jazmin Huertas MD  [facility]   Pager: [phone]

## 2021-03-20 ENCOUNTER — IMMUNIZATION (OUTPATIENT)
Dept: FAMILY MEDICINE | Facility: CLINIC | Age: 71
End: 2021-03-20
Payer: COMMERCIAL

## 2021-03-20 PROCEDURE — 91301 PR COVID VAC MODERNA 100 MCG/0.5 ML IM: CPT

## 2021-03-20 PROCEDURE — 0011A PR COVID VAC MODERNA 100 MCG/0.5 ML IM: CPT

## 2021-03-22 DIAGNOSIS — I10 ESSENTIAL HYPERTENSION: Chronic | ICD-10-CM

## 2021-03-22 DIAGNOSIS — F41.9 ANXIETY: ICD-10-CM

## 2021-03-23 RX ORDER — ROSUVASTATIN CALCIUM 5 MG/1
5 TABLET, COATED ORAL DAILY
Qty: 90 TABLET | Refills: 2 | Status: SHIPPED | OUTPATIENT
Start: 2021-03-23 | End: 2021-12-13

## 2021-03-23 RX ORDER — CITALOPRAM HYDROBROMIDE 10 MG/1
10 TABLET ORAL DAILY
Qty: 90 TABLET | Refills: 3 | Status: SHIPPED | OUTPATIENT
Start: 2021-03-23 | End: 2022-03-15

## 2021-04-17 ENCOUNTER — IMMUNIZATION (OUTPATIENT)
Dept: FAMILY MEDICINE | Facility: CLINIC | Age: 71
End: 2021-04-17
Attending: FAMILY MEDICINE
Payer: COMMERCIAL

## 2021-04-17 PROCEDURE — 91301 PR COVID VAC MODERNA 100 MCG/0.5 ML IM: CPT

## 2021-04-17 PROCEDURE — 0012A PR COVID VAC MODERNA 100 MCG/0.5 ML IM: CPT

## 2021-04-25 ENCOUNTER — HEALTH MAINTENANCE LETTER (OUTPATIENT)
Age: 71
End: 2021-04-25

## 2021-05-19 ENCOUNTER — OFFICE VISIT (OUTPATIENT)
Dept: FAMILY MEDICINE | Facility: CLINIC | Age: 71
End: 2021-05-19
Payer: COMMERCIAL

## 2021-05-19 VITALS
BODY MASS INDEX: 29.5 KG/M2 | SYSTOLIC BLOOD PRESSURE: 109 MMHG | HEART RATE: 75 BPM | TEMPERATURE: 97.9 F | DIASTOLIC BLOOD PRESSURE: 73 MMHG | WEIGHT: 172.8 LBS | HEIGHT: 64 IN | OXYGEN SATURATION: 99 %

## 2021-05-19 DIAGNOSIS — I10 ESSENTIAL HYPERTENSION: ICD-10-CM

## 2021-05-19 DIAGNOSIS — M17.11 PRIMARY LOCALIZED OSTEOARTHROSIS OF RIGHT LOWER LEG: Primary | ICD-10-CM

## 2021-05-19 PROCEDURE — 20610 DRAIN/INJ JOINT/BURSA W/O US: CPT | Performed by: STUDENT IN AN ORGANIZED HEALTH CARE EDUCATION/TRAINING PROGRAM

## 2021-05-19 PROCEDURE — 99207 PR NO CHARGE LOS: CPT | Performed by: STUDENT IN AN ORGANIZED HEALTH CARE EDUCATION/TRAINING PROGRAM

## 2021-05-19 RX ORDER — TRIAMCINOLONE ACETONIDE 40 MG/ML
40 INJECTION, SUSPENSION INTRA-ARTICULAR; INTRAMUSCULAR ONCE
Status: COMPLETED | OUTPATIENT
Start: 2021-05-19 | End: 2021-05-19

## 2021-05-19 RX ADMIN — TRIAMCINOLONE ACETONIDE 40 MG: 40 INJECTION, SUSPENSION INTRA-ARTICULAR; INTRAMUSCULAR at 09:47

## 2021-05-19 ASSESSMENT — MIFFLIN-ST. JEOR: SCORE: 1296.57

## 2021-05-19 NOTE — PROGRESS NOTES
PROCEDURE:  JOINT ASPIRATION/INJECTION    After a discussion of risks, benefits and side effects of procedure, informed patient consent was obtained.  The right KNEE was prepped in the usual clean fashion (sterile not required for this procedure).  Procedure was completed withOUT Ultrasound Guidance.    INJECTION:  Using 2 cc of 1 % lidocaine mixed with 40 mg of Kenalog, the knee was successfully injected without complication.  Patient did not experience some pain relief following injection- although she notes that today was a good day and she was not having much pain before.    Invasive Procedure Safety Checklist completed by nurse? No     Marjorie Mclaughlin, MS4    I was present with the medical student who participated in the service and in the documentation of this note. I have verified the history and personally performed the physical exam and medical decision making, and have verified the content of the note, which accurately reflects my assessment of the patient and the plan of care.   Jazmin Huertas MD

## 2021-05-19 NOTE — PROGRESS NOTES
HPI:     Nolvia Kimball is a 71 year old  female with PMH of HTN and OA who presents for:    Nolvia Kimball is by herself.    Right Knee Pain  - Chronic knee pain from OA.   - Located in posterior right knee, worse with knee flexion   - Last injection 1/6/21. Good relief from that for several months   - Pain re-started a few weeks ago. Has been stopping her from kneeling and gardening.   - Last time referred to Ortho, but did not meet with them due to COVID. Referral. Would like to put off knee replacement as long as possible   - No recent injuries   - Has never been to PT for this knee. Went for left knee after injury     Health Maintenance  - TDAP: Patient reports she had TDAP in March 2020.          PMHX:     Patient Active Problem List   Diagnosis     Essential hypertension     Gastroesophageal reflux disease without esophagitis       Current Outpatient Medications   Medication Sig Dispense Refill     BOOSTRIX 5-2.5-18.5 LF-MCG/0.5 injection        citalopram (CELEXA) 10 MG tablet Take 1 tablet (10 mg) by mouth daily 90 tablet 3     lisinopril (ZESTRIL) 20 MG tablet Take 1 tablet (20 mg) by mouth daily 90 tablet 0     omeprazole (PRILOSEC OTC) 20 MG EC tablet Take 1 tablet (20 mg) by mouth daily 90 tablet 1     PREVNAR 13 SUSP injection   0     rosuvastatin (CRESTOR) 5 MG tablet Take 1 tablet (5 mg) by mouth daily 90 tablet 2     SHINGRIX injection   1       Social History     Tobacco Use     Smoking status: Former Smoker     Packs/day: 1.00     Years: 10.00     Pack years: 10.00     Types: Cigarettes     Smokeless tobacco: Never Used   Substance Use Topics     Alcohol use: Yes     Alcohol/week: 1.0 standard drinks     Types: 1 Shots of liquor per week     Frequency: 2-4 times a month     Drinks per session: 1 or 2     Drug use: No       Social History     Social History Narrative     Not on file       Allergies   Allergen Reactions     No Known Allergies        No results found for this or any previous  "visit (from the past 24 hour(s)).         Review of Systems:   7 point ROS negative except as noted.           Physical Exam:     Vitals:    05/19/21 0853   BP: 109/73   Pulse: 75   Temp: 97.9  F (36.6  C)   TempSrc: Oral   SpO2: 99%   Weight: 78.4 kg (172 lb 12.8 oz)   Height: 1.63 m (5' 4.17\")     Body mass index is 29.5 kg/m .    General: Alert, well-appearing female in NAD  Ext: Warm, well perfused, 2+ BL radial pulses, no LE edema. Right knee with no swelling/erythema/warmth, no tenderness to palpation along joint lines or posterior knee, full ROM of knee- both flexion and extension without pain.   Skin: No rash on limited skin exam  Psych: Mood appropriate to visit content, full affect, rational thought content and process        Procedure     PROCEDURE:  JOINT ASPIRATION/INJECTION     After a discussion of risks, benefits and side effects of procedure, informed patient consent was obtained. Consent was signed and scanned into the record. The right knee was prepped with betadine.      INJECTION:  Using 1.5 cc of 2 % lidocaine mixed with 40 mg of kenalog, the right knee was successfully injected without complication.  Patient did experience some pain relief following injection.    Student: Marjorie Mclaughlin  Faculty: Jazmin Huertas MD present for and supervised this entire procedure.    Assessment and Plan     (M17.11) Primary localized osteoarthrosis of right lower leg (primary encounter diagnosis): Patient received repeat cortisone injection to knee, last injection 1/6/21. Risks and benefits reviewed and procedure completed as above. Discussed with patient that she will likely experience shorter relief with each injection and that these are a temporary solution for pain relief. She was previously referred to Ortho but would like to delay knee replacement as long as possible, which is very reasonable. Patient will return for new or worsening symptoms. She will continue to do home PT exercises for her knee " pain.   - Large Joint/Bursa injection and/or drainage -         Unilateral (Shoulder, Knee) [20610],         triamcinolone (KENALOG-40) injection 40 mg     There are no discontinued medications.    Options for treatment and follow-up care were reviewed with the patient and/or guardian. Nolvia Kimball and/or guardian engaged in the decision making process and verbalized understanding of the options discussed and agreed with the final plan.    Marjorie Mclaughlin, MS4    Jazmin Huertas MD  [facility]   Pager: [phone]

## 2021-05-21 RX ORDER — LISINOPRIL 20 MG/1
20 TABLET ORAL DAILY
Qty: 90 TABLET | Refills: 1 | Status: SHIPPED | OUTPATIENT
Start: 2021-05-21 | End: 2021-11-15

## 2021-05-28 NOTE — OP NOTE
COLONOSCOPY OPERATION REPORT     OPERATION PERFORMED:  Colonoscopy with IV sedation  DATE OF OPERATION: 25 April 2019  SURGEON(S): Jovani Reagan III, MD, FAAFP    ASSISTANT(S): Valente Lunsford MD    Preoperative Diagnosis: Colorectal Cancer Screen  (Z12.11)    Postoperative Diagnosis: Polyps of the Colon, internal hemorrhoids     History:    [id number] here for initial screening colonoscopy. Denies symptoms. No family history of colon cancer.        Shortly Before Procedure  Time out was completed. Correct patient ID, procedure verified, positioning verified, implants/equipment available, studies available as needed. Consents signed and on the chart. Emergency resuscitation equipment available if needed. TYPE OF ANESTHESIA:  Moderate / Conscious Sedation. Patient monitored in the usual fashion with pulse ox, NIBP, and EKG. See flowsheet for full details.      MEDS Versed  (mg) Fentanyl (mcg) Benadryl  (mg) Zofran  (mg) Glucagon (mg)    4 100 0 0 0       DESCRIPTION OF OPERATION:  Assuring patient comfort, a rectal exam revealed normal sphincter tone, no masses, no stool in the rectal vault. The video colonoscope was passed from anus to cecum under direct visualization with expected amount of difficulty. The cecum was identified by the ileocecal valve, appendiceal orifice, and crows foot. Mucosa was inspected > 6 minute scope withdrawal.         Findings   Prep:    Excellent   EBL:                     < 5 mL   Terminal Ileum: Intubation Not Performed   Cecum:                     Normal appearance.   Ascending Colon:    Normal appearance.       Transverse Colon:   Normal appearance.        Desc. Colon: Normal appearance.       Sigmoid Colon:        Single polyp <5mm removed by cold bx forceps.        Rectum:                    Moderate internal hemorrhoids       Complications: None.     Polyps submitted:                     1   Diverticulosis:    None   Internal Hemorrhoids: Moderate   External Hemorrhoids: None        TIMES time  min   SEDATION START 0701 TOTAL SEDATION TIME 62   PROCEDURE START 0706 TOTAL PROCEDURE TIME 57   CECUM REACHED 0723 TIME TO CECUM 17   PROCEDURE STOP 0803 WITHDRAWAL TIME 40     Total sedation time: 62 minutes of continuous bedside 1:1 monitoring  DISPOSITION   Follow up/Repeat Colonoscopy:                     Pending pathology review.             Jovani Reagan III, MD, FAAFP

## 2021-05-31 VITALS — WEIGHT: 172 LBS

## 2021-06-03 VITALS
BODY MASS INDEX: 29.53 KG/M2 | WEIGHT: 173 LBS | BODY MASS INDEX: 29.53 KG/M2 | WEIGHT: 173 LBS | HEIGHT: 64 IN | HEIGHT: 64 IN

## 2021-06-07 DIAGNOSIS — K21.9 GASTROESOPHAGEAL REFLUX DISEASE WITHOUT ESOPHAGITIS: ICD-10-CM

## 2021-06-07 RX ORDER — OMEPRAZOLE 20 MG/1
20 TABLET, DELAYED RELEASE ORAL DAILY
Qty: 90 TABLET | Refills: 1 | Status: SHIPPED | OUTPATIENT
Start: 2021-06-07 | End: 2021-11-29

## 2021-06-15 NOTE — PROGRESS NOTES
Assessment:     1. Subacute ethmoidal sinusitis  amoxicillin-clavulanate (AUGMENTIN) 875-125 mg per tablet          Plan:   -Advised patient to take medications as prescribed.  Continue using over-the-counter medication for possible virus infection.  Advised her to return to clinic if symptoms does not resolve after treatment.  May use ibuprofen or Tylenol for pain or fever.      Subjective:       67 y.o. female presents for evaluation of nasal congestion for 3 weeks.  Patient reports that she is not able to tastes or smells anything.  She reports a headache that is sometimes pounding and sometimes nagging.  She reports some teeth pain, cough that is nonproductive, she denies nausea, vomiting, diarrhea, or fever.  She reports that she has been using over-the-counter medications with no symptom relief.  She reports that she has been blowing her nose clear discharge.  She denies pain in her sinuses.    The following portions of the patient's history were reviewed and updated as appropriate: allergies, current medications, past family history, past medical history, past social history, past surgical history and problem list.    Review of Systems  A 12 point comprehensive review of systems was negative except as noted.     Objective:      /80  Pulse 80  Temp 97.7  F (36.5  C) (Oral)   Wt 172 lb (78 kg)  SpO2 96%  General appearance: alert, appears stated age, cooperative and mild distress  Head: Normocephalic, without obvious abnormality, atraumatic  Eyes: conjunctivae/corneas clear. PERRL, EOM's intact. Fundi benign.  Ears: abnormal TM right ear - bulging and air-fluid level  Nose: purulent discharge, moderate congestion, sinus tenderness right  Throat: abnormal findings: moderate oropharyngeal erythema  Back: symmetric, no curvature. ROM normal. No CVA tenderness.  Lungs: clear to auscultation bilaterally  Heart: regular rate and rhythm, S1, S2 normal, no murmur, click, rub or gallop  Skin: Skin color,  texture, turgor normal. No rashes or lesions  Lymph nodes: Cervical, supraclavicular, and axillary nodes normal.  Neurologic: Grossly normal     This note has been dictated using voice recognition software. Any grammatical or context distortions are unintentional and inherent to the software

## 2021-06-19 NOTE — LETTER
Letter by Jovani Reagan III, MD at      Author: Jovani Reagan III, MD Service:  Author Type:     Filed:  Date of Service:  Status: (Other)                     19      Nolvia Kimball  [address] [place] [address]    : 1950    Nolvia Kimball,    The results from your colonoscopy showed one precancerous polyp. Based on these results, current guidelines recommend repeat exam in 5-10 years for surveillance colonoscopy (in the years 2852-9594).    Thank you coming to see me for your colonoscopy. Please let me know if there is any further information that you need.    Dr. Tez Reagan III, MD, FAAFP  Faculty Physician, [facility]    Department of Family Medicine and Community Health  [employer] [facility]    Office: [phone]

## 2021-06-19 NOTE — LETTER
Letter by Jovani Reagan III, MD at      Author: Jovani Reagan III, MD Service:  Author Type:     Filed:  Date of Service:  Status: (Other)                     19    RE: GLORIA KIM, : 1950    Jazmin Huertas MD    I have performed the colonoscopy on Gloria Kim and found one tubular adenoma. Based on the pathology, current guidelines recommend repeat exam in 5-10 years for surveillance colonoscopy (in the years 0193-7712).    Your patient has been informed of the results and recommended follow up plan.    Thank you for the referral. Please let me know if there is any further information that you need.    Very Respectfully,    Kiko Reagan III, MD, FAAFP  Faculty Physician, [facility]    Department of Family Medicine and Community Health  [employer] [facility] [employer]    Office: [phone]

## 2021-10-10 ENCOUNTER — HEALTH MAINTENANCE LETTER (OUTPATIENT)
Age: 71
End: 2021-10-10

## 2021-11-08 ENCOUNTER — IMMUNIZATION (OUTPATIENT)
Dept: FAMILY MEDICINE | Facility: CLINIC | Age: 71
End: 2021-11-08
Payer: COMMERCIAL

## 2021-11-08 PROCEDURE — 91306 COVID-19,PF,MODERNA (18+ YRS BOOSTER .25ML): CPT

## 2021-11-08 PROCEDURE — 0064A COVID-19,PF,MODERNA (18+ YRS BOOSTER .25ML): CPT

## 2021-11-15 DIAGNOSIS — I10 ESSENTIAL HYPERTENSION: ICD-10-CM

## 2021-11-15 RX ORDER — LISINOPRIL 20 MG/1
20 TABLET ORAL DAILY
Qty: 90 TABLET | Refills: 0 | Status: SHIPPED | OUTPATIENT
Start: 2021-11-15 | End: 2022-02-14

## 2021-11-29 DIAGNOSIS — K21.9 GASTROESOPHAGEAL REFLUX DISEASE WITHOUT ESOPHAGITIS: ICD-10-CM

## 2021-11-29 RX ORDER — OMEPRAZOLE 20 MG/1
20 TABLET, DELAYED RELEASE ORAL DAILY
Qty: 90 TABLET | Refills: 3 | Status: SHIPPED | OUTPATIENT
Start: 2021-11-29 | End: 2022-12-08

## 2021-11-29 NOTE — TELEPHONE ENCOUNTER
Message to physician:     Date of last visit: 5/19/2021    Date of next visit if scheduled:    Potassium   Date Value Ref Range Status   01/06/2021 3.9 3.4 - 5.3 mmol/L Final     Creatinine   Date Value Ref Range Status   01/06/2021 0.8 0.6 - 1.3 mg/dL Final       BP Readings from Last 3 Encounters:   05/19/21 109/73   01/06/21 127/82   09/16/20 122/77       No results found for: A1C    Please complete refill and CLOSE ENCOUNTER.  Closing the encounter signifies the refill is complete.

## 2021-12-13 ENCOUNTER — OFFICE VISIT (OUTPATIENT)
Dept: FAMILY MEDICINE | Facility: CLINIC | Age: 71
End: 2021-12-13
Payer: COMMERCIAL

## 2021-12-13 VITALS
HEIGHT: 64 IN | DIASTOLIC BLOOD PRESSURE: 76 MMHG | BODY MASS INDEX: 30.92 KG/M2 | RESPIRATION RATE: 18 BRPM | OXYGEN SATURATION: 97 % | TEMPERATURE: 98 F | HEART RATE: 84 BPM | SYSTOLIC BLOOD PRESSURE: 130 MMHG | WEIGHT: 181.12 LBS

## 2021-12-13 DIAGNOSIS — M17.11 PRIMARY LOCALIZED OSTEOARTHROSIS OF RIGHT LOWER LEG: ICD-10-CM

## 2021-12-13 DIAGNOSIS — I10 ESSENTIAL HYPERTENSION: Chronic | ICD-10-CM

## 2021-12-13 DIAGNOSIS — M17.11: Primary | ICD-10-CM

## 2021-12-13 PROCEDURE — 99213 OFFICE O/P EST LOW 20 MIN: CPT | Mod: 25 | Performed by: FAMILY MEDICINE

## 2021-12-13 PROCEDURE — 20610 DRAIN/INJ JOINT/BURSA W/O US: CPT | Mod: RT | Performed by: FAMILY MEDICINE

## 2021-12-13 RX ORDER — TRIAMCINOLONE ACETONIDE 40 MG/ML
40 INJECTION, SUSPENSION INTRA-ARTICULAR; INTRAMUSCULAR ONCE
Status: COMPLETED | OUTPATIENT
Start: 2021-12-13 | End: 2021-12-13

## 2021-12-13 RX ORDER — ROSUVASTATIN CALCIUM 5 MG/1
5 TABLET, COATED ORAL DAILY
Qty: 90 TABLET | Refills: 2 | Status: SHIPPED | OUTPATIENT
Start: 2021-12-13 | End: 2022-09-12

## 2021-12-13 RX ORDER — LIDOCAINE HYDROCHLORIDE 20 MG/ML
5 INJECTION, SOLUTION INFILTRATION; PERINEURAL ONCE
Status: COMPLETED | OUTPATIENT
Start: 2021-12-13 | End: 2021-12-13

## 2021-12-13 RX ADMIN — TRIAMCINOLONE ACETONIDE 40 MG: 40 INJECTION, SUSPENSION INTRA-ARTICULAR; INTRAMUSCULAR at 08:50

## 2021-12-13 RX ADMIN — LIDOCAINE HYDROCHLORIDE 4 ML: 20 INJECTION, SOLUTION INFILTRATION; PERINEURAL at 08:50

## 2021-12-13 ASSESSMENT — MIFFLIN-ST. JEOR: SCORE: 1331.55

## 2021-12-13 NOTE — PROGRESS NOTES
HPI:   Nolvia Kimball is a 71 year old who presents for:    Chief Complaint   Patient presents with     Injections     Cortisone injection right knee     Medication Reconciliation     Needs attention     Right knee pain: Longstanding right knee pain from osteoarthritis.  She is a good relief from corticosteroid knee injections in the past.  Most recent injection was May 19, 2021.  Pain is worse with prolonged weightbearing, bending.  Pain has been worse over the past month.  She remains active setting up amazing home and yard decorations for Halloween and [place].    She would like a steroid injection of the right knee today.    She is familiar with some knee exercises as she had physical therapy for her left knee after an injury in the past, as well as accompanying her  to various physical therapy appointments.    Hypertension: Blood pressure is well controlled on lisinopril 20 mg daily.  She takes Crestor 5 mg for primary prevention.  No side effects with Crestor.  She requests a refill today             PMHX:     Patient Active Problem List   Diagnosis     Essential hypertension     Gastroesophageal reflux disease without esophagitis       Current Outpatient Medications   Medication Sig Dispense Refill     citalopram (CELEXA) 10 MG tablet Take 1 tablet (10 mg) by mouth daily 90 tablet 3     lisinopril (ZESTRIL) 20 MG tablet Take 1 tablet (20 mg) by mouth daily 90 tablet 0     omeprazole (PRILOSEC OTC) 20 MG EC tablet Take 1 tablet (20 mg) by mouth daily 90 tablet 3     rosuvastatin (CRESTOR) 5 MG tablet Take 1 tablet (5 mg) by mouth daily 90 tablet 2     BOOSTRIX 5-2.5-18.5 LF-MCG/0.5 injection        PREVNAR 13 SUSP injection  (Patient not taking: Reported on 12/13/2021)  0     SHINGRIX injection  (Patient not taking: Reported on 12/13/2021)  1       Social History     Tobacco Use     Smoking status: Former Smoker     Packs/day: 1.00     Years: 10.00     Pack years: 10.00     Types: Cigarettes  "    Smokeless tobacco: Never Used   Substance Use Topics     Alcohol use: Yes     Alcohol/week: 1.0 standard drink     Types: 1 Shots of liquor per week     Comment: 2 drinks/week     Drug use: No       Social History     Social History Narrative     Not on file       Allergies   Allergen Reactions     No Known Allergies        No results found for this or any previous visit (from the past 24 hour(s)).         Review of Systems:     General: No recent illness.  No fevers  ENT: No upper respiratory symptoms.  No loss of taste or smell.  Covid screening questions are negative  Respiratory: No cough or shortness of breath  Cardiovascular: No chest pain         Physical Exam:     Vitals:    12/13/21 0825 12/13/21 0831   BP: (!) 142/80 130/76   BP Location: Left arm Left arm   Patient Position: Sitting Sitting   Cuff Size: Adult Large Adult Large   Pulse: 84    Resp: 18    Temp: 98  F (36.7  C)    TempSrc: Oral    SpO2: 97%    Weight: 82.2 kg (181 lb 1.9 oz)    Height: 1.626 m (5' 4\")      Body mass index is 31.09 kg/m .    General: Alert,   no acute distress  HEENT: Head is free of trauma.   Sclerae non-icteric.to auscultation bilaterally  Ext: Right knee: No effusion, erythema, skin changes.  No joint line tenderness.  Range of motion intact.  Skin: exposed skin free of rash  Psych: Mood appropriate       Assessment and Plan   1. Localized osteoarthritis of right lower leg  We discussed risks of steroid knee injection and alternatives.  Despite the risks of bleeding, infection, steroid flare, tendon injury, patient elected steroid injection.    She was given standard post injection cares in the office and written after visit summary.    She can resume her normal activities in 2 days    Procedure note outlined below    2. Primary localized osteoarthrosis of right lower leg  As outlined above  - Large Joint/Bursa injection and/or drainage - Unilateral (Shoulder, Knee) [20610]  - triamcinolone (KENALOG-40) injection 40 " mg  - Lidocaine 2 % injection    3. Essential hypertension  Well-controlled  Crestor for primary prevention was refilled today    - rosuvastatin (CRESTOR) 5 MG tablet; Take 1 tablet (5 mg) by mouth daily  Dispense: 90 tablet; Refill: 2    Options for treatment and follow-up care were reviewed with the patient and/or guardian. Nolvia Kimball and/or guardian engaged in the decision making process and verbalized understanding of the options discussed and agreed with the final plan.    Misael Houston MD  Faculty - [facility]                                                          PROCEDURE:  JOINT ASPIRATION/INJECTION    After a discussion of risks, benefits and side effects of procedure, informed patient consent was obtained and consent signed.  A pre-procedure safety checklist was reviewed and signed prior to starting the procedure.  The right knee was prepped with alcohol and betadine in the usual clean fashion.    INJECTION:  Using 4.0 cc of 2 % lidocaine mixed with 40 mg of Kenalog, the right knee was successfully injected using an anterior-lateral approach, 25 g. Needle, under sterile technique without complication.    Invasive Procedure Safety Checklist completed by nurse? Yes     Triamcinolone Acetonide 40mg/1 ml.    NDC: [address]-4  Lot: [id number]  Exp: 06/30/2023    Lidocaine HCL  2%  NDC: 7140-9351-95  Lot: -[id number]  Exp: 01/28/2022

## 2021-12-13 NOTE — PATIENT INSTRUCTIONS
Great to see you today!      Ice your knee for 20 minutes 3 times a day for the next 2 days and limiting bending and stooping for 2 days - then resume normal activities!      Emily Garner-  Hope your knee feels better soon!        Joint Injection/Aspiration    What is done during a joint aspiration/injection?  Joint injections or aspirations (taking fluid out of a joint) usually are performed with a cold spray or other local anesthesia in the office or hospital setting. After the skin surface is thoroughly cleaned, the joint is entered with a needle attached to a syringe. At this point, either joint fluid can be obtained (aspirated) and used for appropriate laboratory testing or medications can be injected into the joint space. This technique also applies to injections into a bursa or tendon sheath to treat bursitis and tendonitis, respectively.  What benefit is derived from a joint aspiration?  Joint aspiration usually is done for help with diagnosis or treatment. Fluid obtained from a joint aspiration can be examined by the physician or sent for laboratory analysis, which may include a cell count (the number of white or red blood cells), crystal analysis (to confirm the presence of gout or pseudogout), and/or culture (to determine if an infection is present inside the joint). Drainage of a large joint effusion can provide pain relief and improved mobility. Injection of a drug into the joint may yield complete or short-term relief of symptoms.   What benefit is derived from a joint injection?  Joint injections may decrease the accumulation of fluid and cells in the joint and may temporarily decrease pain and stiffness. They may be given to treat inflammatory joint conditions, such as rheumatoid arthritis, psoriatic arthritis, gout, tendonitis, bursitis and, occasionally, osteoarthritis.  What usually is injected into the joint space?  Corticosteroids (such as methylprednisolone and triamcinolone formulated to  stay primarily in the joint) frequently are used. They are anti-inflammatory agents that slow down the accumulation of cells responsible for producing inflammation and pain within the joint space. Although corticosteroids may also be successfully used in osteoarthritis, their mode of action is less clear. Hyaluronic acid (Hyalgan , Synvisc , Orthovisc ) is a viscous lubricating substance that may relieve the symptoms of osteoarthritis of the knee for periods up to 6-12 months. Mode of action is not clear.  Which joints are commonly injected?  Commonly injected joints include the knee, shoulder, ankle, elbow, wrist, base of the thumb and small joints of the hands and feet. Hip joint injection may require the aid of an ultrasound or X-ray called fluoroscopy for guidance. Some small joints may be more easily aspirated or injected with aid of ultrasound.  What are the risks of joint injections and aspirations?  Occasional side effects include allergic reactions to the medicines injected into joints, to tape or the betadine used to clean the skin. Infections are extremely rare complications of joint injections and occur less than 1 time per 15,000 corticosteroid injections. Another uncommon complication is post-injection flare--joint swelling and pain several hours after the corticosteroid or hyaluronic acid injection--which occurs in approximately 1 out of 50 patients and usually subsides within several days. It is not known if joint damage may be related to too-frequent corticosteroid injections. Generally, repeated and numerous injections into the same joint/site should be discouraged. Other complications, though infrequent, include depigmentation (a whitening of the skin), local fat atrophy (thinning of the skin) at the injection site and rupture of a tendon located in the path of the injection.  Are there situations where a joint injection should not be given?  Yes. The most common reasons for not performing a joint  injection are the presence of an infection in or around a joint and if someone has a serious allergy to one or more of the medications that are injected into a joint. If an infection is suspected, aspiration of joint fluid for cultures is essential.      2010 [employer]

## 2021-12-13 NOTE — NURSING NOTE
Medication given: Triamcinolone Acetonide 40mg/1ml  NDC:[address]-10  Lot: [id number]  Exp: 06/30/2023      Medication given: 2% Lidocaine HCL  NDC: 0954-2028-71  Lot: -[id number]  Exp: 01/28/22

## 2022-01-07 ENCOUNTER — OFFICE VISIT (OUTPATIENT)
Dept: FAMILY MEDICINE | Facility: CLINIC | Age: 72
End: 2022-01-07
Payer: COMMERCIAL

## 2022-01-07 VITALS
DIASTOLIC BLOOD PRESSURE: 77 MMHG | TEMPERATURE: 98.3 F | WEIGHT: 183 LBS | OXYGEN SATURATION: 96 % | HEIGHT: 64 IN | BODY MASS INDEX: 31.24 KG/M2 | SYSTOLIC BLOOD PRESSURE: 134 MMHG | HEART RATE: 89 BPM | RESPIRATION RATE: 16 BRPM

## 2022-01-07 DIAGNOSIS — M17.0 PRIMARY OSTEOARTHRITIS OF BOTH KNEES: ICD-10-CM

## 2022-01-07 DIAGNOSIS — Z12.31 ENCOUNTER FOR SCREENING MAMMOGRAM FOR BREAST CANCER: Primary | ICD-10-CM

## 2022-01-07 DIAGNOSIS — I10 ESSENTIAL HYPERTENSION: ICD-10-CM

## 2022-01-07 LAB
ANION GAP SERPL CALCULATED.3IONS-SCNC: 12 MMOL/L (ref 5–18)
BUN SERPL-MCNC: 18 MG/DL (ref 8–22)
CALCIUM SERPL-MCNC: 9 MG/DL (ref 8.5–10.5)
CHLORIDE BLD-SCNC: 113 MMOL/L (ref 98–107)
CHOLEST SERPL-MCNC: 186 MG/DL
CO2 SERPL-SCNC: 17 MMOL/L (ref 22–31)
CREAT SERPL-MCNC: 0.76 MG/DL (ref 0.6–1.1)
FASTING STATUS PATIENT QL REPORTED: YES
GFR SERPL CREATININE-BSD FRML MDRD: 84 ML/MIN/1.73M2
GLUCOSE BLD-MCNC: 92 MG/DL (ref 70–125)
HDLC SERPL-MCNC: 58 MG/DL
LDLC SERPL CALC-MCNC: 104 MG/DL
POTASSIUM BLD-SCNC: 3.9 MMOL/L (ref 3.5–5)
SODIUM SERPL-SCNC: 142 MMOL/L (ref 136–145)
TRIGL SERPL-MCNC: 120 MG/DL

## 2022-01-07 PROCEDURE — 99213 OFFICE O/P EST LOW 20 MIN: CPT | Mod: 25 | Performed by: STUDENT IN AN ORGANIZED HEALTH CARE EDUCATION/TRAINING PROGRAM

## 2022-01-07 PROCEDURE — 80061 LIPID PANEL: CPT | Performed by: STUDENT IN AN ORGANIZED HEALTH CARE EDUCATION/TRAINING PROGRAM

## 2022-01-07 PROCEDURE — 90732 PPSV23 VACC 2 YRS+ SUBQ/IM: CPT | Performed by: STUDENT IN AN ORGANIZED HEALTH CARE EDUCATION/TRAINING PROGRAM

## 2022-01-07 PROCEDURE — G0009 ADMIN PNEUMOCOCCAL VACCINE: HCPCS | Performed by: STUDENT IN AN ORGANIZED HEALTH CARE EDUCATION/TRAINING PROGRAM

## 2022-01-07 PROCEDURE — 36415 COLL VENOUS BLD VENIPUNCTURE: CPT | Performed by: STUDENT IN AN ORGANIZED HEALTH CARE EDUCATION/TRAINING PROGRAM

## 2022-01-07 PROCEDURE — 80048 BASIC METABOLIC PNL TOTAL CA: CPT | Performed by: STUDENT IN AN ORGANIZED HEALTH CARE EDUCATION/TRAINING PROGRAM

## 2022-01-07 ASSESSMENT — MIFFLIN-ST. JEOR: SCORE: 1342.83

## 2022-01-07 NOTE — PROGRESS NOTES
HPI:     Nolvia Kimball is a 71 year old  female with PMH of HTN who presents for BP follow up.    Nolvia Kimball is here alone.    Checks blood pressure at home every once in a while and it's always <140/90.  No chest pain, no shortness of breath, no leg swelling, no headaches.  No side effects of meds that she knows of.  Eats fruits and veggies regularly. Does a lot of cooking. Diabetic diet for her .  Exercise is mostly through day to day activities. 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 She does the light project every year. Brings everyone a lot of kylee.     She is due for mammogram- she will get this on her schedule. No breast cancer history in her family.   Due for pneumovax, fine with getting that today.     Smoking history- smoked about 1ppd for about 10 years, quit in early 90s.     Drinks some alcohol (socially) will have 1-3 drinks on social occasions.    Lives with Abilio (). They don't have kids together but Abilio has kids from prior relationship.    Got kenalog injection in mid December with Dr. Houston and she said it is working great! She doesn't even notice her knee when going down stairs anymore and she states when she has a lot of housework there are days when she goes up and down maybe 50 times in a day.             PMHX:     Patient Active Problem List   Diagnosis     Essential hypertension     Gastroesophageal reflux disease without esophagitis       Current Outpatient Medications   Medication Sig Dispense Refill     BOOSTRIX 5-2.5-18.5 LF-MCG/0.5 injection        citalopram (CELEXA) 10 MG tablet Take 1 tablet (10 mg) by mouth daily 90 tablet 3     lisinopril (ZESTRIL) 20 MG tablet Take 1 tablet (20 mg) by mouth daily 90 tablet 0     omeprazole (PRILOSEC OTC) 20 MG EC tablet Take 1 tablet (20 mg) by mouth daily 90 tablet 3     PREVNAR 13 SUSP injection   0     rosuvastatin (CRESTOR) 5 MG tablet Take 1 tablet (5 mg) by mouth daily  "90 tablet 2     SHINGRIX injection   1       Social History     Tobacco Use     Smoking status: Former Smoker     Packs/day: 1.00     Years: 10.00     Pack years: 10.00     Types: Cigarettes     Smokeless tobacco: Never Used   Substance Use Topics     Alcohol use: Yes     Alcohol/week: 1.0 standard drink     Types: 1 Shots of liquor per week     Comment: 2 drinks/week     Drug use: No       Social History     Social History Narrative     Not on file       Allergies   Allergen Reactions     No Known Allergies        No results found for this or any previous visit (from the past 24 hour(s)).         Review of Systems:   7 point ROS negative except as noted.           Physical Exam:     Vitals:    01/07/22 0812 01/07/22 0815   BP: (!) 142/86 134/77   Pulse: 89    Resp: 16    Temp: 98.3  F (36.8  C)    TempSrc: Oral    SpO2: 96%    Weight: 83 kg (183 lb)    Height: 1.63 m (5' 4.17\")      Body mass index is 31.24 kg/m .    General: Alert, well-appearing female in NAD  HEENT: PERRL, moist oral mucus membranes, normal external auditory canals and TMs bilaterally  Pulm: CTA BL, no tachypnea  CV: RRR, no murmur  Ext: Warm, well perfused, 2+ BL radial pulses, no LE edema  Skin: No rash on limited skin exam  Psych: Mood appropriate to visit content, full affect, rational thought content and process    The 10-year ASCVD risk score (Pranay DACOSTA Jr., et al., 2013) is: 11.2%    Values used to calculate the score:      Age: 71 years      Sex: Female      Is Non- : No      Diabetic: No      Tobacco smoker: No      Systolic Blood Pressure: 134 mmHg      Is BP treated: Yes      HDL Cholesterol: 66 mg/dL      Total Cholesterol: 166 mg/dL      Assessment and Plan     1. Encounter for screening mammogram for breast cancer  - [facility] SCREENING DIGITAL BILAT; Future    2. Essential hypertension  Well controlled, continue lisinopril 20mg daily. Continue crestor for primary prevention. Will check lipids today to make sure we " are at correct crestor dose. She is fasting. Discussed healthy diet and exercise.   - Lipid Profile  - Basic metabolic panel    3. Immunizations  Got pneumovax 23 today.     4. Health maintenance  Has a living will    There are no discontinued medications.    Options for treatment and follow-up care were reviewed with the patient and/or guardian. Nolvia RENATA Rubiokate and/or guardian engaged in the decision making process and verbalized understanding of the options discussed and agreed with the final plan.    Jazmin Huertas MD  [facility]   Pager: [phone]

## 2022-01-07 NOTE — PATIENT INSTRUCTIONS
Great to see you, Rochelle!    Your blood pressure is normal, continue lisinopril 20mg once a day and crestor once a day.    We will let you know when we get the results of your blood tests back.    Sincerely,  Dr. Huertas

## 2022-01-11 NOTE — RESULT ENCOUNTER NOTE
Dear Rochelle,    Your kidneys look great. Continue with your blood pressure medicine just the same.     Your cholesterol is well controlled by your current cholesterol medicine. Continue to take that every day.     Great to see you as always and congrats on your big win (with the light display)!    Sincerely,  Dr. Huertas    The 10-year ASCVD risk score (Pranay DACOSTA Jr., et al., 2013) is: 11.9%    Values used to calculate the score:      Age: 71 years      Sex: Female      Is Non- : No      Diabetic: No      Tobacco smoker: No      Systolic Blood Pressure: 134 mmHg      Is BP treated: Yes      HDL Cholesterol: 58 mg/dL      Total Cholesterol: 186 mg/dL      Sincerely,  Dr. Jazmin Huertas

## 2022-02-10 ENCOUNTER — MYC MEDICAL ADVICE (OUTPATIENT)
Dept: FAMILY MEDICINE | Facility: CLINIC | Age: 72
End: 2022-02-10
Payer: COMMERCIAL

## 2022-02-14 DIAGNOSIS — I10 ESSENTIAL HYPERTENSION: ICD-10-CM

## 2022-02-14 NOTE — TELEPHONE ENCOUNTER
Message to physician:     Date of last visit: 1/7/2022    Date of next visit if scheduled: none    Potassium   Date Value Ref Range Status   01/07/2022 3.9 3.5 - 5.0 mmol/L Final   01/06/2021 3.9 3.4 - 5.3 mmol/L Final     Creatinine   Date Value Ref Range Status   01/07/2022 0.76 0.60 - 1.10 mg/dL Final   01/06/2021 0.8 0.6 - 1.3 mg/dL Final     GFR Estimate   Date Value Ref Range Status   01/07/2022 84 >60 mL/min/1.73m2 Final     Comment:     Effective December 21, 2021 eGFRcr in adults is calculated using the 2021 CKD-EPI creatinine equation which includes age and gender (Swapna et al., NEJM, DOI: 10.1056/WQCTve2758658)       BP Readings from Last 3 Encounters:   01/07/22 134/77   12/13/21 130/76   05/19/21 109/73       No results found for: A1C    Please complete refill and CLOSE ENCOUNTER.  Closing the encounter signifies the refill is complete.     She has an appt on 3/2/18 and will discuss it then as concern about her reaction to Ambien.

## 2022-02-15 RX ORDER — LISINOPRIL 20 MG/1
20 TABLET ORAL DAILY
Qty: 90 TABLET | Refills: 1 | Status: SHIPPED | OUTPATIENT
Start: 2022-02-15 | End: 2022-08-15

## 2022-03-14 DIAGNOSIS — F41.9 ANXIETY: ICD-10-CM

## 2022-03-15 RX ORDER — CITALOPRAM HYDROBROMIDE 10 MG/1
10 TABLET ORAL DAILY
Qty: 90 TABLET | Refills: 3 | Status: SHIPPED | OUTPATIENT
Start: 2022-03-15 | End: 2023-03-06

## 2022-04-12 ENCOUNTER — OFFICE VISIT (OUTPATIENT)
Dept: FAMILY MEDICINE | Facility: CLINIC | Age: 72
End: 2022-04-12
Payer: COMMERCIAL

## 2022-04-12 VITALS
OXYGEN SATURATION: 96 % | TEMPERATURE: 97.7 F | HEIGHT: 64 IN | BODY MASS INDEX: 30.9 KG/M2 | SYSTOLIC BLOOD PRESSURE: 127 MMHG | RESPIRATION RATE: 16 BRPM | DIASTOLIC BLOOD PRESSURE: 82 MMHG | WEIGHT: 181 LBS | HEART RATE: 75 BPM

## 2022-04-12 DIAGNOSIS — M17.0 PRIMARY OSTEOARTHRITIS OF BOTH KNEES: ICD-10-CM

## 2022-04-12 DIAGNOSIS — Z23 HIGH PRIORITY FOR 2019-NCOV VACCINE: Primary | ICD-10-CM

## 2022-04-12 PROCEDURE — 91306 COVID-19,PF,MODERNA (18+ YRS BOOSTER .25ML): CPT | Performed by: STUDENT IN AN ORGANIZED HEALTH CARE EDUCATION/TRAINING PROGRAM

## 2022-04-12 PROCEDURE — 0064A COVID-19,PF,MODERNA (18+ YRS BOOSTER .25ML): CPT | Performed by: STUDENT IN AN ORGANIZED HEALTH CARE EDUCATION/TRAINING PROGRAM

## 2022-04-12 PROCEDURE — 20610 DRAIN/INJ JOINT/BURSA W/O US: CPT | Mod: RT | Performed by: STUDENT IN AN ORGANIZED HEALTH CARE EDUCATION/TRAINING PROGRAM

## 2022-04-12 RX ORDER — TRIAMCINOLONE ACETONIDE 40 MG/ML
40 INJECTION, SUSPENSION INTRA-ARTICULAR; INTRAMUSCULAR ONCE
Status: COMPLETED | OUTPATIENT
Start: 2022-04-12 | End: 2022-04-12

## 2022-04-12 RX ORDER — LIDOCAINE HYDROCHLORIDE 20 MG/ML
2 INJECTION, SOLUTION INFILTRATION; PERINEURAL ONCE
Status: COMPLETED | OUTPATIENT
Start: 2022-04-12 | End: 2022-04-12

## 2022-04-12 RX ADMIN — TRIAMCINOLONE ACETONIDE 40 MG: 40 INJECTION, SUSPENSION INTRA-ARTICULAR; INTRAMUSCULAR at 14:43

## 2022-04-12 RX ADMIN — LIDOCAINE HYDROCHLORIDE 2 ML: 20 INJECTION, SOLUTION INFILTRATION; PERINEURAL at 14:51

## 2022-04-12 NOTE — PROGRESS NOTES
"  Subjective:  Rochelle has been having a hard time for the last 2 months because her  had a diabetic foot wound and systemic infection that required hospitalization and surgery, TCU stay, and now he is completely non weight bearing. 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 He recently had ostomy issues and stool was leaking almost all the time for 4 days. She feels like she is in survival mode. The good thing is that she feels her celexa is helping her cope and even though she has a lot of stress she feels she is coping well.     Right knee is bothering her more because she has to go up and down stairs more because her  has to be in their basement due to his health conditions. Last injection was with Dr. Houston in December and it helped a lot. Would like another injection today.       /82   Pulse 75   Temp 97.7  F (36.5  C) (Oral)   Resp 16   Ht 1.632 m (5' 4.25\")   Wt 82.1 kg (181 lb)   SpO2 96%   BMI 30.83 kg/m      Exam:  Healthy in appearance, no distress  Right knee with moderate effusion, no redness or warmth.     A/P:  Right knee osteoarthritis  Has benefitted a lot from steroid injection in the past, completed again today, see procedure note below.    HTN  Well controlled, not due for BMP until this summer    Life stress  Patient coping well, does not need additional supports right now.    Patient got covid booster today    PROCEDURE:  JOINT ASPIRATION/INJECTION    After a discussion of risks, benefits and side effects of procedure, informed patient consent was obtained.  The right knee was prepped and draped in the usual clean fashion.     INJECTION:  Using 2 cc of 2 % lidocaine mixed with 40 mg of kenalog, the right knee was successfully injected without complication.  Patient did not experience some pain relief following injection.    Invasive Procedure Safety Checklist completed by me? Yes     Jazmin Huertas MD  Family Medicine     "

## 2022-04-22 ENCOUNTER — ANCILLARY PROCEDURE (OUTPATIENT)
Dept: MAMMOGRAPHY | Facility: CLINIC | Age: 72
End: 2022-04-22
Attending: STUDENT IN AN ORGANIZED HEALTH CARE EDUCATION/TRAINING PROGRAM
Payer: COMMERCIAL

## 2022-04-22 DIAGNOSIS — Z12.31 ENCOUNTER FOR SCREENING MAMMOGRAM FOR BREAST CANCER: ICD-10-CM

## 2022-04-22 PROCEDURE — 77067 SCR MAMMO BI INCL CAD: CPT

## 2022-05-21 ENCOUNTER — HEALTH MAINTENANCE LETTER (OUTPATIENT)
Age: 72
End: 2022-05-21

## 2022-08-15 DIAGNOSIS — I10 ESSENTIAL HYPERTENSION: ICD-10-CM

## 2022-08-15 RX ORDER — LISINOPRIL 20 MG/1
20 TABLET ORAL DAILY
Qty: 90 TABLET | Refills: 1 | Status: SHIPPED | OUTPATIENT
Start: 2022-08-15 | End: 2023-02-17

## 2022-08-19 ASSESSMENT — ENCOUNTER SYMPTOMS
BREAST MASS: 0
HEMATURIA: 0
NAUSEA: 0
MYALGIAS: 0
ABDOMINAL PAIN: 0
NERVOUS/ANXIOUS: 0
DYSURIA: 0
HEARTBURN: 0
SHORTNESS OF BREATH: 0
PALPITATIONS: 0
FREQUENCY: 1
SHORTNESS OF BREATH: 0
MYALGIAS: 0
DIZZINESS: 0
DIARRHEA: 0
DIARRHEA: 0
EYE PAIN: 0
HEARTBURN: 0
NAUSEA: 0
DYSURIA: 0
JOINT SWELLING: 0
ARTHRALGIAS: 1
EYE PAIN: 0
HEMATURIA: 0
FEVER: 0
JOINT SWELLING: 0
WEAKNESS: 0
HEMATOCHEZIA: 0
CHILLS: 0
WEAKNESS: 0
CONSTIPATION: 0
PALPITATIONS: 0
ARTHRALGIAS: 1
HEADACHES: 0
PARESTHESIAS: 0
BREAST MASS: 0
NERVOUS/ANXIOUS: 0
SORE THROAT: 0
CHILLS: 0
PARESTHESIAS: 0
FREQUENCY: 1
COUGH: 0
ABDOMINAL PAIN: 0
COUGH: 0
HEMATOCHEZIA: 0
DIZZINESS: 0
SORE THROAT: 0
HEADACHES: 0
FEVER: 0
CONSTIPATION: 0

## 2022-08-19 ASSESSMENT — ACTIVITIES OF DAILY LIVING (ADL)
CURRENT_FUNCTION: NO ASSISTANCE NEEDED
CURRENT_FUNCTION: NO ASSISTANCE NEEDED

## 2022-08-26 ENCOUNTER — OFFICE VISIT (OUTPATIENT)
Dept: FAMILY MEDICINE | Facility: CLINIC | Age: 72
End: 2022-08-26

## 2022-08-26 ENCOUNTER — OFFICE VISIT (OUTPATIENT)
Dept: FAMILY MEDICINE | Facility: CLINIC | Age: 72
End: 2022-08-26
Payer: COMMERCIAL

## 2022-08-26 VITALS
HEART RATE: 70 BPM | DIASTOLIC BLOOD PRESSURE: 79 MMHG | BODY MASS INDEX: 30.91 KG/M2 | TEMPERATURE: 98 F | WEIGHT: 181.04 LBS | OXYGEN SATURATION: 97 % | HEIGHT: 64 IN | SYSTOLIC BLOOD PRESSURE: 125 MMHG | RESPIRATION RATE: 18 BRPM

## 2022-08-26 DIAGNOSIS — Z00.00 MEDICARE ANNUAL WELLNESS VISIT, INITIAL: Primary | ICD-10-CM

## 2022-08-26 DIAGNOSIS — Z00.00 MEDICARE ANNUAL WELLNESS VISIT, SUBSEQUENT: ICD-10-CM

## 2022-08-26 DIAGNOSIS — Z00.00 ENCOUNTER FOR MEDICARE ANNUAL WELLNESS EXAM: ICD-10-CM

## 2022-08-26 DIAGNOSIS — Z00.00 WELLNESS EXAMINATION: Primary | ICD-10-CM

## 2022-08-26 PROCEDURE — 99207 PR NO CHARGE NURSE ONLY: CPT

## 2022-08-26 PROCEDURE — G0439 PPPS, SUBSEQ VISIT: HCPCS | Performed by: STUDENT IN AN ORGANIZED HEALTH CARE EDUCATION/TRAINING PROGRAM

## 2022-08-26 NOTE — PROGRESS NOTES
"Answers for HPI/ROS submitted by the patient on 8/19/2022  In general, how would you rate your overall physical health?: good  Frequency of exercise:: 2-3 days/week  Do you usually eat at least 4 servings of fruit and vegetables a day, include whole grains & fiber, and avoid regularly eating high fat or \"junk\" foods? : Yes  Taking medications regularly:: Yes  Medication side effects:: None  Activities of Daily Living: no assistance needed  Home safety: no safety concerns identified  Hearing Impairment:: no hearing concerns  In the past 6 months, have you been bothered by leaking of urine?: Yes  abdominal pain: No  Blood in stool: No  Blood in urine: No  chest pain: No  chills: No  congestion: No  constipation: No  cough: No  diarrhea: No  dizziness: No  ear pain: No  eye pain: No  nervous/anxious: No  fever: No  frequency: Yes  genital sores: No  headaches: No  hearing loss: No  heartburn: No  arthralgias: Yes  joint swelling: No  peripheral edema: No  mood changes: No  myalgias: No  nausea: No  dysuria: No  palpitations: No  Skin sensation changes: No  sore throat: No  urgency: No  rash: No  shortness of breath: No  visual disturbance: No  weakness: No  pelvic pain: No  vaginal bleeding: No  vaginal discharge: No  tenderness: No  breast mass: No  breast discharge: No  In general, how would you rate your overall mental or emotional health?: excellent  Additional concerns today:: No  Duration of exercise:: 15-30 minutes        Annual Wellness Visit for 65 years and older    HPI  This 72 year old female presents as an established patient  Jazmin Huertas who presents for an annual wellness visit.    Other issues patient wants to be addressed today:    Patient has had a really rough summer since her  has been completely bed bound in the basement so she is his helper for everything he needs (he has a healing wound of his foot and has been completely non weight bearing). She has had moments of feeling very " overwhelmed and crying and then she always feels better after a good cry. She has been able to carry on thanks to knowing that things will likely be back to normal soon (they have an appt coming up this week and he should get clearance to do some weight bearing).     Chief Complaint   Patient presents with     Wellness Visit     No concerns          Patient Active Problem List   Diagnosis     Essential hypertension     Gastroesophageal reflux disease without esophagitis     Primary osteoarthritis of both knees     Past Medical History:   Diagnosis Date     Essential hypertension 2/14/2019     History of hepatitis B        Family History   Problem Relation Age of Onset     Cerebrovascular Disease Mother      Hypertension Mother      Hypertension Brother      Cerebrovascular Disease Maternal Grandmother      Cerebrovascular Disease Maternal Aunt      Breast Cancer No family hx of      Cancer No family hx of      Diabetes No family hx of      Heart Disease No family hx of      Asthma No family hx of      Problem List, Family History and past Medical History reviewed and  unchanged/updated.    There are no exam notes on file for this visit.    Are you sexually active?  Yes    If yes, with men, women, or both? Male  If yes, do you more than one current partner?No  If yes, are you using condoms?  No  Have you had any sexually transmitted infections? No   Any sexual concerns? No     FOR WOMEN  What year did you stop having periods? Age 37 stopped while on birth control, went through menopause around 42  Any vaginal bleeding in the last year? No  Have you ever had an abnormal Pap smear? No      Frailty Assessment    1. Weight loss (>5% in year)  No  Wt Readings from Last 5 Encounters:   08/26/22 82.1 kg (181 lb 0.6 oz)   04/12/22 82.1 kg (181 lb)   01/07/22 83 kg (183 lb)   12/13/21 82.2 kg (181 lb 1.9 oz)   05/19/21 78.4 kg (172 lb 12.8 oz)       2. Exhaustion (perceived effort for a given activity)   How difficult  is walking from one room to the other on the same level?not   No     3. Weakness (handgrip strength)   How difficult is lifting or carrying something as heavy as 10 pounds? not   No    4. Decreased physical activity    Compared with most (men/women) your age, would you say  that you are more active, less active, or about the same? more   No    5. Slow gait speed (timed up and go > 12 sec.)  No  FALL RISK ASSESSMENT 8/26/2022 1/23/2019   Fallen 2 or more times in the past year? No No   Any fall with injury in the past year? No No   Timed Up and Go Test/Seconds (13.5 is a fall risk; contact physician) 7 -         Frailty screen score: 0    Frail Assessment:0 Robust         EVALUATION OF COGNITIVE FUNCTION  Mood/affect:Normal  Appearance:Normal  Family member/caregiver input: Normal    PHQ-2 Score:   PHQ-2 ( 1999 Pfizer) 8/19/2022 8/19/2022   Q1: Little interest or pleasure in doing things 0 0   Q2: Feeling down, depressed or hopeless 0 0   PHQ-2 Score 0 0   PHQ-2 Total Score (12-17 Years)- Positive if 3 or more points; Administer PHQ-A if positive - -   Q1: Little interest or pleasure in doing things Not at all Not at all   Q2: Feeling down, depressed or hopeless Not at all Not at all   PHQ-2 Score 0 0       PHQ-9 Score:   [facility] [place] Follow-up to PHQ 1/23/2019 1/27/2020 2/24/2020   PHQ-9 9. Suicide Ideation past 2 weeks Not at all Not at all Not at all       Mini Cog Test:      Recall result:  3 points   Clock Draw Test result: Normal    Cognitive screen is:Negative      Other Assessments:  CV Risk based on Pooled Cohort Risk   The 10-year ASCVD risk score (Harrisburggarrett DACOSTA Jr., et al., 2013) is: 11.7%    Values used to calculate the score:      Age: 72 years      Sex: Female      Is Non- : No      Diabetic: No      Tobacco smoker: No      Systolic Blood Pressure: 125 mmHg      Is BP treated: Yes      HDL Cholesterol: 58 mg/dL      Total Cholesterol: 186 mg/dL    Screening Lipid Level (covered every 5 years ):  Date done 2022  Result(s) Lipids well controlled on crestor  ASA use (>3% risk in 5 y):  Testing not indicated   Cervical cancer screening:  Covered  until age 70 every 2 years unless high risk):  Testing not indicated   HIV screening (at risk ):  Testing not indicated   Colon CA Screening (>50-75 ) (FITT annually or colonoscopy every 10years):  Date done 2019  Result(s) Tubular adenoma, repeat recommended in 5-10 years  Breast CA Screenin-2 years 50-74years:  Date done 2022  Result(s) BIRADS1, Dexa Scan (>65 yrs) (covered every 2 years):  Date done 2019  Result(s) Normal bone density, US for AAA (65-76 yo+ever smoked):  Testing not indicated  and Diabetes Screening : FBG covered if at risk (obesity, HTN, dyslipidemia, FH): Date done 2022  Result(s) normal blood sugar on BMP    ECG (if done)not performed    Corrected Visual acuity:   Hearing evaluation if done: No        Advance Directives: Discussed with patient and family as appropriate. Has patient  completed advance directives and/or a living will? yes      Immunization History   Administered Date(s) Administered     COVID-19,PF,Moderna 2021, 2021     COVID-19,PF,Moderna Booster 2021, 2022     FLU 6-35 months 2009     Influenza (H1N1) 2010     Influenza (High Dose) 3 valent vaccine 10/25/2017     Influenza (IIV3) PF 10/08/1999, 2001, 2003, 10/27/2010, 2011, 2012     Influenza Quad, Recombinant, pf(RIV4) (Flublok) 10/04/2019, 10/01/2020     Influenza Vaccine IM > 6 months Valent IIV4 (Alfuria,Fluzone) 2015     Influenza Vaccine, 6+MO IM (QUADRIVALENT W/PRESERVATIVES) 2015, 10/04/2019     Influenza, Quad, High Dose, Pf, 65yr+ (Fluzone HD) 10/12/2021     Pneumococcal 23 valent 2022     TD (ADULT, 7+) 1997     TDAP Vaccine (Boostrix) 2008     Td (Adult), Adsorbed 1997     Tdap (Adacel,Boostrix) 2020     Tdap (Adult)  "Unspecified Formulation 01/30/2020     Zoster vaccine recombinant adjuvanted (SHINGRIX) 03/21/2019, 06/28/2019     Reviewed Immunization Record Today  Physical Exam    Vitals: /79   Pulse 70   Temp 98  F (36.7  C)   Resp 18   Ht 1.635 m (5' 4.37\")   Wt 82.1 kg (181 lb 0.6 oz)   SpO2 97%   BMI 30.72 kg/m    BMI= Body mass index is 30.72 kg/m .  EXAM:  Constitutional: healthy, alert and no distress   Cardiovascular: negative, PMI normal. No lifts, heaves, or thrills. RRR. No murmurs, clicks gallops or rub  Respiratory: negative, no tachypnea, Good diaphragmatic excursion. Lungs clear  Psychiatric: mentation appears normal and affect normal/bright  Head: Normocephalic. No masses, lesions, tenderness or abnormalities  Neck: Neck supple. No adenopathy. Thyroid symmetric, normal size  Abdomen: Abdomen soft, non-tender. BS normal. No masses, organomegaly  SKIN: no suspicious lesions or rashes  JOINT/EXTREMITIES: extremities normal- no gross deformities noted, gait normal and normal muscle tone    Assessment and Plan:    Reviewed Preventive Services and Plan form with patient as specified in  Patient Instructions.    Positive findings on assessment: Patient has had a significant amount of stress this summer taking care of her  who has been bed bound in the basement due to foot wound. He is likely fully recovered (they have check in with his physician next week) and so she is hopeful they can get back to normal soon. Her mood has stayed stable throughout and she is coping well.     Her memory testing is normal and she has no signs of frailty.    She is up to date on cancer screenings and DEXA.     She is up to date on vaccines.     She has an advance directive on file and is full code.     She will come to see me next month for a knee injection for her arthritis. At that time we will check BMP and consider A1c (she has had normal blood glucose in the past but given BMI would be reasonable to do A1c). "     Nolvia was seen today for wellness visit.    Diagnoses and all orders for this visit:  Medicare annual wellness visit, subsequent  -     Full Code          Options for treatment and follow-up care were reviewed with the Nolvia Kimball  and/or guardian engaged in the decision making process and verbalized  understanding of the options discussed and agreed with the final plan.  Jazmin Huertas MD

## 2022-08-26 NOTE — PATIENT INSTRUCTIONS
PERSONAL PREVENTIVE SERVICES PLAN - SERVICES     Review these tests with your medical staff then decide which ones you want and take this page home for your reference      SCREENING TESTS     Description   Year of Last Screening   Recommended Today?   Heart disease screening blood tests    Cholesterol level Reducing cholesterol can reduce your risk of heart attacks by 25%.  Screening is recommended yearly if you are at risk of heart disease otherwise every 4-5 years 1/7/22 No; is up to date.   Diabetes screening tests    Hemoglobin A1c blood test   Finding and treating diabetes early can reduce complications.  Screening recommended/covered yearly if you have high blood pressure, high cholesterol, obesity (BMI >30), or a history of high blood glucose tests; or 2 of the following: family history of diabetes, overweight (BMI >25 but <30), age 65 years or older, and a history of diabetes of pregnancy or gave birth to baby weighing more than 9 lbs. 4/7/22  BMP,glucose- 92 No: is not indicated today.   Hepatitis B screening Finding hepatitis B early can reduce complications.  Screening is recommended for persons with selected risk factors. 1/23/19  negative No: is not indicated today.   Hepatitis C screening Finding hepatitis C early can reduce complications.  Screening is recommended for all persons born from 1945 through 1965 and for those with selected other risk factors.  1/23/19  negative No: is not indicated today.   HIV screening Finding HIV early can reduce complications.  Screening is recommended for persons with risk factors for HIV infection.  No: is not indicated today.   Glaucoma screening Early detection of glaucoma can prevent blindness.   Please talk to your eye doctor about this.       SCREENING TESTS     Description   Year of Last Screening   Recommended Today?   Colorectal cancer screening    Fecal occult blood test     Screening colonoscopy Screening for colon cancer has been shown to reduce death  from colon cancer by 25-30%. Screening recommended to start at 50 years and continuing until age 75 years.   4/25/19  Tubular adenoma No,not indicated today,DUE for repeat in 5- 10 yrs   Breast Cancer Screening (women)    Mammogram Mammogram screening for breast cancer has been shown to reduce the risk of dying from breast cancer and prolong life. Screening is recommended every 1-2 years for women aged 50 to 74 years.  4/22/22  negative No; is up to date.   Cervical Cancer screening (women)    Pap Cervical pap smears can reduce cervical cancer. Screening is recommended annually if high risk (history of abnormal pap smears) otherwise every 2-3 years, stop screening at 65 years of age if history of normal paps.  No: is not indicated today.   Screening for Osteoporosis:  Bone mass measurements (women)    Dexa Scan Screening and treating Osteoporosis can reduce the risk of hip and spine fractures. Screening is recommended in women 65 years or older and in women and men at risk of osteoporosis. 1/28/19  normal No; is up to date.   Screening for Lung Cancer     Low-dose CT scanning Screening can reduce mortality in persons aged 55-80 who have smoked at least 30 pack-years and who are either still smoking or have quit in the past 15 years.  No: is not indicated today.   Abdominal Aortic Aneurysm (AAA) screening    Ultrasound (US)   An aneurysm treated before rupture is very safe -a ruptured aneurysm can be fatal.  Screening  by US for AAA is limited to patients who meet one of the following criteria:    Men who are 65-75 years old and have smoked more than 100 cigarettes in their lifetime    Anyone with a family history of abdominal aortic aneurysm  No: is not indicated today.     Here are your recommended immunizations.  Take this home for your reference.                                                    IMMUNIZATIONS Description Recommend today?     Influenza (Flu shot) Prevents flu; should get every year No: is not  indicated today.   PCV 20 Pneumonia vaccination; you get it once Yes; Recommended    PPSV 23 Second pneumonia vaccination; usually get it 1 year after PCV 13 No; is up to date.   Zoster (Shingles) Prevents shingles; you get it once  (Check with Part D insurance for coverage, must receive at a pharmacy, not clinic) No; is up to date.   Tetanus Prevents tetanus; once every 10 years No; is up to date.     Hepatitis B  If you have any of the following risk factors you should be immunized for hepatitis B: severe kidney disease, people who live in the same house as a carrier of Hepatitis B virus, people who live in  institutions (e.g. nursing homes or group homes), homosexual men, patients with hemophilia who received Factor VIII or IX concentrates, abusers of illicit injectable drugs No: is not indicated today.      PATIENT INSTRUCTIONS    Yearly exam:     See your health care provider every year in order to review changes in your health, review medicines that you take, and discuss preventive care needs such as immunizations and cancer screening.    Get a flu shot each year.     Advance Directives:    If you have not done so, you are encouraged to complete advance directives and/or a living will.   More information about advance directives can be found at: http://www.mnmed.org/advocacy/Key-Issues/Advance-Directives    Nutrition:     Eat at least 5 servings of fruits and vegetables each day.     Eat whole-grain bread, whole-wheat pasta and brown rice instead of white grains and rice.     Talk to your doctor about Calcium and Vitamin D.     Lifestyle:    Exercise for at least 150 minutes a week (30 minutes a day, 5 days a week). This will help you control your weight and prevent disease.     Limit alcohol to one drink per day.     If you smoke, try to quit - your doctor will be happy to help.     Wear sunscreen to prevent skin cancer.     See your dentist every six months for an exam and cleaning.     See your eye doctor  every 1 to 2 years to screen for conditions such as glaucoma, macular degeneration and cataracts.                            Personalized Prevention Plan  You are due for the preventive services outlined below.  Your care team is available to assist you in scheduling these services.  If you have already completed any of these items, please share that information with your care team to update in your medical record.  Health Maintenance Due   Topic Date Due     Flu Vaccine (1) 09/01/2022

## 2022-08-26 NOTE — PROGRESS NOTES
Medicare Wellness Visit  Health Risk Assessment           Health Risk Assessment / Review of Systems     Constitutional: Any fevers or night sweats? No     Eyes:  Vision problems   No     Hearing Do you feel you have hearing loss?  No     Cardiovascular: Any chest pain, fast or irregular heart beat, calf pain with walking?     No           Respiratory:   Any breathing problems or cough?   No     Gastrointestinal: Any stomach or stool problems?   No      Genitourinary: Do you have difficulty controlling urination?   No     Muscles and Joints: Any joint stiffness or soreness?    YES - hx arthritis, known discomfort in bilateral knees, no worsening in time of day- experiences throughout the day. Has received cortisone injections in bilateral knees, last injection was May 2022 in right knee.    Skin: Any concerning lesions or moles?   No     Nervous System: Any loss of strength or feeling, numbness or tingling, shaking, dizziness, or headache?  No     Mental Health: Any depression, anxiety or problems sleeping?    No     Cognition: Do you have any problems with your memory?  No            Medical Care     What other specialists or organizations are involved in your medical care?  none  Patient Care Team       Relationship Specialty Notifications Start End    Jazmin Huertas MD PCP - General Family Practice  1/21/19     Phone: [phone] Fax: [phone] [address] [facility] [address]    Jazmin Huertas MD Assigned PCP   6/20/21     Phone: [phone] Fax: [phone]         [facility] [facility] [address]          Have you been to the ER or overnight in the hospital in the last year?  No          Social History / Home Safety       Marital Status:  Who lives in your household? Self and spouse    Do you feel threatened or controlled by a partner, ex-partner or anyone in your life? No     Has anyone hurt you physically, for example by pushing, hitting, slapping or kicking you   or  forcing you to have sex? No          Does your home have any of the following safety concerns; loose rugs in the hallway,  bathrooms with no grab bars by the tub or toilet, stairs with no handrails or poorly lit areas?  No     Do you need help with dressing yourself, bathing, eating or getting around your home?  No     Do you need help with the phone, transportation, shopping, preparing meals, housework, laundry, medications or managing money?No       Risk Behaviors and Healthy Habits     History   Smoking Status     Former Smoker     Packs/day: 1.00     Years: 10.00     Types: Cigarettes     Quit date: 1991   Smokeless Tobacco     Never Used     How many servings of fruits and vegetables do you eat a day? 3 servings a day, reviewed daily intake recommendation and encouraged when able to increase intake.    Exercise: gardening daily x 4 hours during the summer, walking 2 times a week x 30 mins     Do you frequently drive without a seatbelt? No     Do you use tobacco?  No     Do you use any other drugs? No         Do you use alcohol?Yes  Number of drinks per day :0, social  Number of drinking days a week : 0-1      Frailty Assessment            Have you lost 10 or more pounds unintentionally in the previous year? No     How difficult is walking from one room to the other on the same level?not       Is it difficult to lift or carry something as heavy as 10 pounds?not      Compared with most (men/women) your age, would you say  that you are more active, less active, or about the same? same        FALL RISK ASSESSMENT 8/26/2022 1/23/2019   Fallen 2 or more times in the past year? No No   Any fall with injury in the past year? No No   Timed Up and Go Test/Seconds (13.5 is a fall risk; contact physician) 7 -         Advance Directives:   Discussed with patient and family as appropriate. Has patient  completed advance directives and/or a living will? Yes- 2019, does not wish to make any changes to advance healthcare  directive at this time.      Kristy Clements RN

## 2022-09-12 DIAGNOSIS — I10 ESSENTIAL HYPERTENSION: Chronic | ICD-10-CM

## 2022-09-13 RX ORDER — ROSUVASTATIN CALCIUM 5 MG/1
5 TABLET, COATED ORAL DAILY
Qty: 90 TABLET | Refills: 3 | Status: SHIPPED | OUTPATIENT
Start: 2022-09-13 | End: 2023-09-12

## 2022-09-18 ENCOUNTER — HEALTH MAINTENANCE LETTER (OUTPATIENT)
Age: 72
End: 2022-09-18

## 2022-10-03 ENCOUNTER — OFFICE VISIT (OUTPATIENT)
Dept: FAMILY MEDICINE | Facility: CLINIC | Age: 72
End: 2022-10-03
Payer: COMMERCIAL

## 2022-10-03 VITALS
TEMPERATURE: 97.8 F | DIASTOLIC BLOOD PRESSURE: 89 MMHG | RESPIRATION RATE: 20 BRPM | HEART RATE: 75 BPM | BODY MASS INDEX: 30.73 KG/M2 | OXYGEN SATURATION: 96 % | WEIGHT: 180 LBS | SYSTOLIC BLOOD PRESSURE: 149 MMHG | HEIGHT: 64 IN

## 2022-10-03 DIAGNOSIS — Z13.1 SCREENING FOR DIABETES MELLITUS: ICD-10-CM

## 2022-10-03 DIAGNOSIS — R73.03 PREDIABETES: ICD-10-CM

## 2022-10-03 DIAGNOSIS — M17.11 PRIMARY OSTEOARTHRITIS OF RIGHT KNEE: Primary | ICD-10-CM

## 2022-10-03 DIAGNOSIS — Z23 NEED FOR PROPHYLACTIC VACCINATION AND INOCULATION AGAINST INFLUENZA: ICD-10-CM

## 2022-10-03 DIAGNOSIS — I10 ESSENTIAL HYPERTENSION: ICD-10-CM

## 2022-10-03 LAB
ANION GAP SERPL CALCULATED.3IONS-SCNC: 10 MMOL/L (ref 7–15)
BUN SERPL-MCNC: 26.8 MG/DL (ref 8–23)
CALCIUM SERPL-MCNC: 9 MG/DL (ref 8.8–10.2)
CHLORIDE SERPL-SCNC: 106 MMOL/L (ref 98–107)
CREAT SERPL-MCNC: 0.87 MG/DL (ref 0.51–0.95)
DEPRECATED HCO3 PLAS-SCNC: 24 MMOL/L (ref 22–29)
GFR SERPL CREATININE-BSD FRML MDRD: 71 ML/MIN/1.73M2
GLUCOSE SERPL-MCNC: 89 MG/DL (ref 70–99)
HBA1C MFR BLD: 5.5 % (ref 0–5.6)
POTASSIUM SERPL-SCNC: 4 MMOL/L (ref 3.4–5.3)
SODIUM SERPL-SCNC: 140 MMOL/L (ref 136–145)

## 2022-10-03 PROCEDURE — 20610 DRAIN/INJ JOINT/BURSA W/O US: CPT | Performed by: FAMILY MEDICINE

## 2022-10-03 PROCEDURE — 91313 COVID-19,PF,MODERNA BIVALENT: CPT | Performed by: FAMILY MEDICINE

## 2022-10-03 PROCEDURE — 80048 BASIC METABOLIC PNL TOTAL CA: CPT | Performed by: FAMILY MEDICINE

## 2022-10-03 PROCEDURE — 36415 COLL VENOUS BLD VENIPUNCTURE: CPT | Performed by: FAMILY MEDICINE

## 2022-10-03 PROCEDURE — 90662 IIV NO PRSV INCREASED AG IM: CPT | Performed by: FAMILY MEDICINE

## 2022-10-03 PROCEDURE — 83036 HEMOGLOBIN GLYCOSYLATED A1C: CPT | Performed by: FAMILY MEDICINE

## 2022-10-03 PROCEDURE — G0008 ADMIN INFLUENZA VIRUS VAC: HCPCS | Performed by: FAMILY MEDICINE

## 2022-10-03 PROCEDURE — 0134A COVID-19,PF,MODERNA BIVALENT: CPT | Performed by: FAMILY MEDICINE

## 2022-10-03 RX ADMIN — TRIAMCINOLONE ACETONIDE 40 MG: 40 INJECTION, SUSPENSION INTRA-ARTICULAR; INTRAMUSCULAR at 13:30

## 2022-10-03 NOTE — PATIENT INSTRUCTIONS
Blood pressure was high on today's check, but was at goal your last two visits    Continue lisinopril 20mg daily    Check blood pressure at home 3-4 times per week the next two weeks and call in your readings    Your goal is under 140/90      Avoid bending and stooping after your knee injection for the next 2-3 days    Ice the knee at the injection site for 20 minutes 3 times a day today and tomorrow

## 2022-10-03 NOTE — PROGRESS NOTES
HPI:   Nolvia Kimball is a 72 year old  female who presents for:    Chief Complaint   Patient presents with     RECHECK     Rt knee cortisone shot, flu shot, 2nd Moderna covid bivalent shot     Imm/Inj     Flu Shot       Long history of anterior right knee pain.  Cortisone injections have been effective the past for relieving pain for 3 months.  She is interested in repeat cortisone therapy.  She is able to do her activities of daily living including maintaining her own home.  She does walk regularly.  No popping or giving out.  No redness or swelling.    Hypertension: Blood pressures typically well controlled.  She cannot recall having blood pressures as high as they were when she checked into the office today.  Home blood pressures typically 130s.  No dizziness lightheadedness or orthostatic symptoms.  No chest pain episodes.    Remains physically active.  Has tried diet modification in the past with success.  Interested in screening for diabetes today..         PMHX:     Patient Active Problem List   Diagnosis     Essential hypertension     Gastroesophageal reflux disease without esophagitis     Primary osteoarthritis of both knees       Current Outpatient Medications   Medication Sig Dispense Refill     BOOSTRIX 5-2.5-18.5 LF-MCG/0.5 injection        citalopram (CELEXA) 10 MG tablet Take 1 tablet (10 mg) by mouth daily 90 tablet 3     lisinopril (ZESTRIL) 20 MG tablet Take 1 tablet (20 mg) by mouth daily 90 tablet 1     omeprazole (PRILOSEC OTC) 20 MG EC tablet Take 1 tablet (20 mg) by mouth daily 90 tablet 3     rosuvastatin (CRESTOR) 5 MG tablet Take 1 tablet (5 mg) by mouth daily 90 tablet 3     PREVNAR 13 SUSP injection  (Patient not taking: Reported on 10/3/2022)  0     SHINGRIX injection  (Patient not taking: Reported on 10/3/2022)  1       Social History     Tobacco Use     Smoking status: Former     Packs/day: 1.00     Years: 10.00     Pack years: 10.00     Types: Cigarettes     Quit date: 1991  "    Years since quittin.7     Smokeless tobacco: Never   Substance Use Topics     Alcohol use: Yes     Alcohol/week: 1.0 standard drink     Types: 1 Shots of liquor per week     Comment: 2 drinks/week     Drug use: No       Social History     Social History Narrative     Not on file       Allergies   Allergen Reactions     No Known Allergies        No results found for this or any previous visit (from the past 24 hour(s)).         Review of Systems:     General: No recent illness.  No fevers  ENT: No upper respiratory symptoms COVID screening questions are negative  Cardiovascular: No new leg swelling, chest pain, or dyspnea symptoms           Physical Exam:     Vitals:    10/03/22 1302 10/03/22 1305   BP: (!) 157/89 (!) 149/89   Pulse: 75    Resp: 20    Temp: 97.8  F (36.6  C)    TempSrc: Oral    SpO2: 96%    Weight: 81.6 kg (180 lb)    Height: 1.625 m (5' 3.98\")      Body mass index is 30.92 kg/m .    General: Alert, in no acute distress  HEENT: Head is free of trauma.   Sclerae non-icteric. PERRL, Moist oral mucus membranes  Resp: Clear to auscultation bilaterally  CV: Regular rate and rhythm  Abd: Soft, non-tender.  Right lower extremity: Knee has no effusions.  No erythema.  Full range of motion.  Skin: exposed skin free of rash  Psych: Mood appropriate     Results for orders placed or performed in visit on 10/03/22   Hemoglobin A1c     Status: Normal   Result Value Ref Range    Hemoglobin A1C 5.5 0.0 - 5.6 %   Basic metabolic panel     Status: Abnormal   Result Value Ref Range    Sodium 140 136 - 145 mmol/L    Potassium 4.0 3.4 - 5.3 mmol/L    Chloride 106 98 - 107 mmol/L    Carbon Dioxide (CO2) 24 22 - 29 mmol/L    Anion Gap 10 7 - 15 mmol/L    Urea Nitrogen 26.8 (H) 8.0 - 23.0 mg/dL    Creatinine 0.87 0.51 - 0.95 mg/dL    Calcium 9.0 8.8 - 10.2 mg/dL    Glucose 89 70 - 99 mg/dL    GFR Estimate 71 >60 mL/min/1.73m2       Procedure Note - Joint Injection      Consent: Affirmation of informed consent was " signed and scanned into the medical record. Risks, benefits and alternatives were discussed. Patient's questions were elicited and answered.     Procedure safety checklist was completed:  Yes  Time Out (Pause for the Cause) completed: Yes    Theright knee was prepped in the usual fashion with alcohol and Betadine.    INJECTION:    1cc of 40mg/mL Kenalog and 4 cc of 1% lidocaine was injected into the right knee via lateral approach without complication. Patient tolerated procedure well. Patient experienced some relief of pain following injection. Bandaid applied.  Routine care discussed.    Follow up: Pt was instructed to call if severe pain, redness, warmth or other concerns     Kenalog 40 mg/mL  NDC [address]-3  Lot number: [id number]  Expiration 04/2024      Assessment and Plan   1. Primary osteoarthritis of right knee  We discussed the risks and potential benefits of steroid knee injection and patient elected repeat steroid knee injection.  Steroid knee injection as outlined above in the procedure note.  Patient tolerated procedure well without any complications.  Given routine postinjection care instructions.  She may have a repeat injection as needed in 3 months    Continue physical activity, strengthening and stabilizing exercises    2. Essential hypertension  Typically well controlled on lisinopril 20 mg daily  Do not recommend making medication change based on 1 reading today  Recommend monitoring blood pressure at home 3-4 times per week over the next 2 weeks, and call with blood pressure readings.  Would consider adding hydrochlorothiazide 12.5 mg daily if blood pressure is consistently above goal of 140/90    - Basic metabolic panel; Future  - Basic metabolic panel    3. Screening for diabetes mellitus  Screening indicated based on age and BMI  A1c returned in the normal range 5.5%  Continue healthy diet choices, regular physical activity    - Hemoglobin A1c; Future  - Hemoglobin A1c    4. BMI  30.0-30.9,adult  Continue healthy diet choices, regular physical activity  - Hemoglobin A1c; Future  - Hemoglobin A1c      6. Need for prophylactic vaccination and inoculation against influenza  Influenza vaccine today, patient was given a vaccine information handout      Follow up:: Blood pressure readings in 2 weeks  Options for treatment and follow-up care were reviewed with the patient and/or guardian. Nolvia Kimball and/or guardian engaged in the decision making process and verbalized understanding of the options discussed and agreed with the final plan.    Misael Houston MD  Faculty - [facility]

## 2022-10-10 RX ORDER — TRIAMCINOLONE ACETONIDE 40 MG/ML
40 INJECTION, SUSPENSION INTRA-ARTICULAR; INTRAMUSCULAR ONCE
Status: COMPLETED | OUTPATIENT
Start: 2022-10-03 | End: 2022-10-03

## 2022-10-17 ENCOUNTER — TELEPHONE (OUTPATIENT)
Dept: FAMILY MEDICINE | Facility: CLINIC | Age: 72
End: 2022-10-17

## 2022-10-17 NOTE — TELEPHONE ENCOUNTER
6 of 8 blood pressures at goal of 140/90 or less.  No medication changes recommended at this time.  Reduce salt, stay active.  Monitor BP twice monthly at home. Call if BP over 140/90    Recommendations sent to patient.    CHONG Houston MD

## 2022-10-17 NOTE — TELEPHONE ENCOUNTER
[facility] phone call message- general phone call:    Reason for call:   Patient called to report BP readings to Dr. Houston per last visit:    128/88  139/81  143/81   142/81  129/78  131/76  127/74  136/79    Return call needed: No    OK to leave a message on voice mail? Yes    Primary language: English      needed? No    Call taken on October 17, 2022 at 8:44 AM by Rohit Goode

## 2022-12-07 DIAGNOSIS — K21.9 GASTROESOPHAGEAL REFLUX DISEASE WITHOUT ESOPHAGITIS: ICD-10-CM

## 2022-12-07 RX ORDER — OMEPRAZOLE 20 MG/1
20 TABLET, DELAYED RELEASE ORAL DAILY
Qty: 90 TABLET | Status: CANCELLED | OUTPATIENT
Start: 2022-12-07

## 2022-12-07 NOTE — TELEPHONE ENCOUNTER
[facility] phone call message- medication clarification/question:    Full Medication Name: omeprazole (PRILOSEC OTC) 20 MG EC tablet    Question: Patient stated she called pharmacy 2 weeks ago and have not heard back from clinic to fill this medication. Please send prescription to pharmacy if able. thanks    Pharmacy confirmed as [id number] [[address] Yes    OK to leave a message on voice mail? Yes    Primary language: English      needed? No    Call taken on December 7, 2022 at 10:21 AM by Rohit Goode

## 2022-12-08 ENCOUNTER — VIRTUAL VISIT (OUTPATIENT)
Dept: FAMILY MEDICINE | Facility: CLINIC | Age: 72
End: 2022-12-08
Payer: COMMERCIAL

## 2022-12-08 DIAGNOSIS — K21.9 GASTROESOPHAGEAL REFLUX DISEASE WITHOUT ESOPHAGITIS: ICD-10-CM

## 2022-12-08 PROCEDURE — 99213 OFFICE O/P EST LOW 20 MIN: CPT | Mod: 95 | Performed by: FAMILY MEDICINE

## 2022-12-08 RX ORDER — OMEPRAZOLE 20 MG/1
20 TABLET, DELAYED RELEASE ORAL DAILY
Qty: 90 TABLET | Refills: 3 | Status: SHIPPED | OUTPATIENT
Start: 2022-12-08 | End: 2023-11-20

## 2022-12-08 NOTE — PROGRESS NOTES
"Family Medicine Video Visit Note  Rochelle is being evaluated via a billable video visit.               Video Visit Consent     Patient was verbally read the following and verbal consent was obtained.  \"Video visits are billed at different rates depending on your insurance coverage. During this emergency period, for some insurers they may be billed the same as an in-person visit.  Please reach out to your insurance provider with any questions.  If during the course of the call the physician/provider feels a video visit is not appropriate, you will not be charged for this service.\"      (Name person giving consent:  Patient   Date verbal consent given:  2022  Time verbal consent given:  11:43 AM)    Patient would like the video invitation sent by: Send to e-mail at: [email or web address]             Chief Complaint   Patient presents with     Refill Request     Omeprazole       {If Provider starts visit, do consent and rooming above using \"VIRTUAL tab.\" DELETE THIS TEXT.}     {If PCS NOT rooming AND it is an interpreted visit, fill out the following. Otherwise delete.}  Due to patient being non-English speaking/uses sign language, an  was used for this visit. Only for face-to-face interpretation by an external agency, date and length of interpretation can be found on the scanned worksheet.     name: ***  Agency: {FMinterpagency:457480}  Language: {FMinterplan}   Telephone number: ***  Type of interpretation: {[facility]:086430}           HPI     {Tell patient that you will call them back if the video visit is disconnected.  Confirm phone number to call them back on if you have not already done so.}  Video Start Time: { :1074512}    Rochelle presents to clinic today for the following health issues:    {SUPERLIST (Optional):512618}    {Additional problems for the provider to add (optional):558360}  Current Outpatient Medications   Medication Sig Dispense Refill     BOOSTRIX " "5-2.5-18.5 LF-MCG/0.5 injection        citalopram (CELEXA) 10 MG tablet Take 1 tablet (10 mg) by mouth daily 90 tablet 3     lisinopril (ZESTRIL) 20 MG tablet Take 1 tablet (20 mg) by mouth daily 90 tablet 1     omeprazole (PRILOSEC OTC) 20 MG EC tablet Take 1 tablet (20 mg) by mouth daily 90 tablet 3     PREVNAR 13 SUSP injection  (Patient not taking: Reported on 10/3/2022)  0     rosuvastatin (CRESTOR) 5 MG tablet Take 1 tablet (5 mg) by mouth daily 90 tablet 3     SHINGRIX injection  (Patient not taking: Reported on 10/3/2022)  1     Allergies   Allergen Reactions     No Known Allergies               Review of Systems:     {ROS COMP (Optional):041482}         Physical Exam:     There were no vitals taken for this visit.  Estimated body mass index is 30.92 kg/m  as calculated from the following:    Height as of 10/3/22: 1.625 m (5' 3.98\").    Weight as of 10/3/22: 81.6 kg (180 lb).    {video visit exam brief selected:429723::\"GENERAL: Healthy, alert and no distress\",\"EYES: Eyes grossly normal to inspection.  No discharge or erythema, or obvious scleral/conjunctival abnormalities.\",\"RESP: No audible wheeze, cough, or visible cyanosis.  No visible retractions or increased work of breathing.  \",\"SKIN: Visible skin clear. No significant rash, abnormal pigmentation or lesions.\",\"NEURO: Cranial nerves grossly intact.  Mentation and speech appropriate for age.\",\"PSYCH: Mentation appears normal, affect normal/bright, judgement and insight intact, normal speech and appearance well-groomed.\"}      {Result Choices:599815}          Assessment and Plan   {Diag Picklist:709892}    Refilled medications that would be required in the next 3 months.     After Visit Information:  {avs options:284108}      No follow-ups on file.      Video-Visit Details    Type of service:  Video Visit    Video End Time (time video stopped): { :8676656}    Originating Location (pt. Location): {video visit patient location:794112::\"Home\"}    Distant " "Location (provider location):  [facility]     Platform used for Video Visit: {Virtual Visit Platforms:451839::\"[email or web address]\"}      Moira Lynch MD  I precepted today with ***    { Help text -click delete when highlighted- Precept ALL visits. Preceptor MUST SEE the patient. Bill regular E&M codes for this visit! Documentation needs to support your billing.}      "

## 2022-12-08 NOTE — PROGRESS NOTES
Rochelle is a 72 year old who is being evaluated via a billable video visit.      How would you like to obtain your AVS? [employer]  If the video visit is dropped, the invitation should be resent by: Other e-mail: [email or web address]  Will anyone else be joining your video visit? No        Subjective   Rochelle is a 72 year old, presenting for the following health issues:  Refill Request (Omeprazole)      HPI     1. Heartburn:  Intermittently trial skipping but gets heartburn back  -never had a known ulcer      Review of Systems   Bowels good, denies n/v      Objective       Vitals:  No vitals were obtained today due to virtual visit.    Physical Exam   GENERAL: Healthy, alert and no distress  EYES: Eyes grossly normal to inspection.  No discharge or erythema, or obvious scleral/conjunctival abnormalities.  RESP: No audible wheeze, cough, or visible cyanosis.  No visible retractions or increased work of breathing.    SKIN: Visible skin clear. No significant rash, abnormal pigmentation or lesions.  NEURO: Cranial nerves grossly intact.  Mentation and speech appropriate for age.  PSYCH: Mentation appears normal, affect normal/bright, judgement and insight intact, normal speech and appearance well-groomed.                Video-Visit Details    Video Start Time: 11:55    Type of service:  Video Visit    Video End Time:12:03 PM    Originating Location (pt. Location): Home        Distant Location (provider location):  On-site    Platform used for Video Visit: Polimax

## 2022-12-08 NOTE — TELEPHONE ENCOUNTER
Patient called back for her medication refill - stated she has been out of this medication for 4 days and is really feeling it.

## 2023-02-13 DIAGNOSIS — I10 ESSENTIAL HYPERTENSION: ICD-10-CM

## 2023-02-16 NOTE — TELEPHONE ENCOUNTER
Patient called stating she requested refills on Saturday through pharmacy and still has not yet heard back from clinic and would like to follow up on request, patient is running low on medication. Please advise.   
- - -

## 2023-02-17 RX ORDER — LISINOPRIL 20 MG/1
20 TABLET ORAL DAILY
Qty: 90 TABLET | Refills: 1 | Status: SHIPPED | OUTPATIENT
Start: 2023-02-17 | End: 2023-08-14

## 2023-03-06 DIAGNOSIS — F41.9 ANXIETY: ICD-10-CM

## 2023-03-07 RX ORDER — CITALOPRAM HYDROBROMIDE 10 MG/1
10 TABLET ORAL DAILY
Qty: 90 TABLET | Refills: 3 | Status: SHIPPED | OUTPATIENT
Start: 2023-03-07 | End: 2024-03-10

## 2023-04-25 ENCOUNTER — ANCILLARY PROCEDURE (OUTPATIENT)
Dept: MAMMOGRAPHY | Facility: CLINIC | Age: 73
End: 2023-04-25
Attending: STUDENT IN AN ORGANIZED HEALTH CARE EDUCATION/TRAINING PROGRAM
Payer: COMMERCIAL

## 2023-04-25 DIAGNOSIS — Z12.31 VISIT FOR SCREENING MAMMOGRAM: ICD-10-CM

## 2023-04-25 PROCEDURE — 77067 SCR MAMMO BI INCL CAD: CPT

## 2023-04-25 NOTE — RESULT ENCOUNTER NOTE
Dear Rochelle,     Your mammogram was normal which is great. We should repeat this in 1-2 years.     Sincerely,  Dr. Jazmin Huertas

## 2023-05-05 ENCOUNTER — OFFICE VISIT (OUTPATIENT)
Dept: FAMILY MEDICINE | Facility: CLINIC | Age: 73
End: 2023-05-05
Payer: COMMERCIAL

## 2023-05-05 VITALS
HEART RATE: 77 BPM | HEIGHT: 63 IN | SYSTOLIC BLOOD PRESSURE: 138 MMHG | DIASTOLIC BLOOD PRESSURE: 87 MMHG | BODY MASS INDEX: 32.96 KG/M2 | OXYGEN SATURATION: 96 % | WEIGHT: 186 LBS

## 2023-05-05 DIAGNOSIS — M17.11 PRIMARY LOCALIZED OSTEOARTHROSIS OF RIGHT LOWER LEG: Primary | ICD-10-CM

## 2023-05-05 PROCEDURE — 90677 PCV20 VACCINE IM: CPT | Performed by: STUDENT IN AN ORGANIZED HEALTH CARE EDUCATION/TRAINING PROGRAM

## 2023-05-05 PROCEDURE — G0009 ADMIN PNEUMOCOCCAL VACCINE: HCPCS | Performed by: STUDENT IN AN ORGANIZED HEALTH CARE EDUCATION/TRAINING PROGRAM

## 2023-05-05 PROCEDURE — 20610 DRAIN/INJ JOINT/BURSA W/O US: CPT | Performed by: STUDENT IN AN ORGANIZED HEALTH CARE EDUCATION/TRAINING PROGRAM

## 2023-05-05 PROCEDURE — 99213 OFFICE O/P EST LOW 20 MIN: CPT | Mod: 25 | Performed by: STUDENT IN AN ORGANIZED HEALTH CARE EDUCATION/TRAINING PROGRAM

## 2023-05-05 RX ORDER — TRIAMCINOLONE ACETONIDE 40 MG/ML
40 INJECTION, SUSPENSION INTRA-ARTICULAR; INTRAMUSCULAR ONCE
Status: COMPLETED | OUTPATIENT
Start: 2023-05-05 | End: 2023-05-05

## 2023-05-05 RX ADMIN — TRIAMCINOLONE ACETONIDE 40 MG: 40 INJECTION, SUSPENSION INTRA-ARTICULAR; INTRAMUSCULAR at 10:38

## 2023-05-05 NOTE — PROGRESS NOTES
"  Assessment & Plan     Primary localized osteoarthrosis of right lower leg  Patient tolerated procedure well, previous injection lasted 7 months. Since she is still having good response to injections, she does not wish to see ortho at this time.   - Large Joint/Bursa injection and/or drainage - Unilateral (Shoulder, Knee) [20610]  - triamcinolone (KENALOG-40) injection 40 mg    HTN  Blood pressure <140/90 on second check today, continue on lisinopril 20mg daily. Patient had normal A1c screen for diabetes last visit. Patient tends to be more active in the spring/summer months since she does a lot of yard work.     Preventive-  Due for pneumococcal vaccine, given today.               BMI:   Estimated body mass index is 32.95 kg/m  as calculated from the following:    Height as of this encounter: 1.6 m (5' 3\").    Weight as of this encounter: 84.4 kg (186 lb).           No follow-ups on file.    Jazmin Huertas MD  [facility]    Dia Byrd is a 72 year old, presenting for the following health issues:  Injection (Pt need knee injection cortisone medication on right knee. )         View : No data to display.              HPI     Last injection in October, did really well with that injection.   A few weeks ago pain got worse again and is interfering with quality of life. She has had no recent injury to the knee, no instability. Pain feels similar to how it has been in the past.   She typically gets 2-3 injections per year. Often they last at least 6 months.   Not interested in seeing orhto at this time. 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     Blood pressure in October was initially high in clinic but on home checks, majority were in the <140/90 range and so lisinopril 20mg daily was continued.     She has no issue with lisinopril.     She is excited to get out in her garden more. Mood is feeling better now that weather is improved and " "her  is more mobile (previously he was non-weight bearing and she was bringing him everything he needed).       Review of Systems   No chest pain, no shortness of breath      Objective    /87   Pulse 77   Ht 1.6 m (5' 3\")   Wt 84.4 kg (186 lb)   SpO2 96%   BMI 32.95 kg/m    Body mass index is 32.95 kg/m .  Physical Exam   GENERAL: healthy, alert and no distress  MS: no gross musculoskeletal defects noted, no edema. No knee effusion. No bruising. See injection note below.             PROCEDURE:  JOINT INJECTION    After a discussion of risks, benefits and side effects of procedure, informed patient consent was obtained.  The right knee was prepped with betadine.       INJECTION:  Using 3 cc of 2 % lidocaine mixed with 40 mg of kenalog, the right knee was successfully injected without complication.  Patient did not experience some pain relief following injection.    Written consent completed prior to procedure.    "

## 2023-08-14 DIAGNOSIS — I10 ESSENTIAL HYPERTENSION: ICD-10-CM

## 2023-08-15 RX ORDER — LISINOPRIL 20 MG/1
20 TABLET ORAL DAILY
Qty: 90 TABLET | Refills: 1 | Status: SHIPPED | OUTPATIENT
Start: 2023-08-15 | End: 2024-02-05

## 2023-09-12 DIAGNOSIS — I10 ESSENTIAL HYPERTENSION: Chronic | ICD-10-CM

## 2023-09-15 RX ORDER — ROSUVASTATIN CALCIUM 5 MG/1
5 TABLET, COATED ORAL DAILY
Qty: 90 TABLET | Refills: 3 | Status: SHIPPED | OUTPATIENT
Start: 2023-09-15 | End: 2024-09-09

## 2023-09-26 NOTE — COMMUNITY RESOURCES LIST (ENGLISH)
09/26/2023   [facility]  N/A  For questions about this resource list or additional care needs, please contact your primary care clinic or care manager.  Phone: [phone]   Email: N/A   Address: [facility] [place] [address]   Hours: N/A        Financial Stability       Utility payment assistance  1  [facility] Distance: 1.05 miles      Phone/Virtual   [address] [place] [address]  Language: English  Hours: Mon - Fri 9:00 AM - 4:00 PM , Sun 9:30 AM - 12:00 PM  Fees: Free   Phone: (972) 243-3220 Email: [email or web address].org Website: [email or web address].org/[facility]/[place]/     2  [place] [employer] [facility] [place]'s Telephone Assistance Plan (TAP) and Federal Lifeline and Affordable Connectivity Program (ACP) Distance: 3.96 miles      Phone/Virtual   [address] [place] [address]  Language: English  Fees: Free   Phone: (912) 495-4729 Email: consumer.crow@[facility]. Website: [place]://[place].gov[place]puc/consumers/telephone/          Important Numbers & Websites       Emergency Services   911  City Services   311  Poison Control   (980) 714-6494  Suicide Prevention Lifeline   (159) 336-2592 (TALK)  Child Abuse Hotline   (306) 812-2224 (4-A-Child)  Sexual Assault Hotline   (197) 934-6300 (HOPE)  National Runaway Safeline   (554) 253-7926 (RUNAWAY)  All-Options Talkline   (755) 118-1645  Substance Abuse Referral   (615) 760-7464 (HELP)

## 2023-10-02 NOTE — COMMUNITY RESOURCES LIST (ENGLISH)
10/02/2023   [facility] [employer]  N/A  For questions about this resource list or additional care needs, please contact your primary care clinic or care manager.  Phone: [phone]   Email: N/A   Address: [facility] [place] [address]   Hours: N/A        Financial Stability       Utility payment assistance  1  [facility] Distance: 1.05 miles      Phone/Virtual   [address] [place] [address]  Language: English  Hours: Mon - Fri 9:00 AM - 4:00 PM , Sun 9:30 AM - 12:00 PM  Fees: Free   Phone: (506) 867-1636 Email: [email or web address].org Website: [email or web address].org/[facility]/[place]/     2  [place] [employer] [facility] [place]'s Telephone Assistance Plan (TAP) and Federal Lifeline and Affordable Connectivity Program (ACP) Distance: 3.96 miles      Phone/Virtual   [address] [place] [address]  Language: English  Fees: Free   Phone: (255) 992-9266 Email: consumer.crow@[facility]. Website: [place]://[place]puc/consumers/telephone/          Important Numbers & Websites       Emergency Services   911  City Services   311  Poison Control   (768) 471-5901  Suicide Prevention Lifeline   (479) 298-3871 (TALK)  Child Abuse Hotline   (385) 504-3958 (4-A-Child)  Sexual Assault Hotline   (474) 218-9096 (HOPE)  National Runaway Safeline   (818) 862-8519 (RUNAWAY)  All-Options Talkline   (936) 623-7312  Substance Abuse Referral   (690) 436-7628 (HELP)

## 2023-10-03 ENCOUNTER — OFFICE VISIT (OUTPATIENT)
Dept: FAMILY MEDICINE | Facility: CLINIC | Age: 73
End: 2023-10-03
Payer: COMMERCIAL

## 2023-10-03 VITALS
BODY MASS INDEX: 33.48 KG/M2 | HEART RATE: 84 BPM | OXYGEN SATURATION: 97 % | WEIGHT: 189 LBS | DIASTOLIC BLOOD PRESSURE: 87 MMHG | SYSTOLIC BLOOD PRESSURE: 129 MMHG

## 2023-10-03 DIAGNOSIS — M17.11 PRIMARY LOCALIZED OSTEOARTHROSIS OF RIGHT LOWER LEG: Primary | ICD-10-CM

## 2023-10-03 DIAGNOSIS — Z23 NEED FOR PROPHYLACTIC VACCINATION AND INOCULATION AGAINST INFLUENZA: ICD-10-CM

## 2023-10-03 PROCEDURE — 99213 OFFICE O/P EST LOW 20 MIN: CPT | Mod: 25 | Performed by: STUDENT IN AN ORGANIZED HEALTH CARE EDUCATION/TRAINING PROGRAM

## 2023-10-03 PROCEDURE — 20610 DRAIN/INJ JOINT/BURSA W/O US: CPT | Performed by: STUDENT IN AN ORGANIZED HEALTH CARE EDUCATION/TRAINING PROGRAM

## 2023-10-03 PROCEDURE — 90662 IIV NO PRSV INCREASED AG IM: CPT | Performed by: STUDENT IN AN ORGANIZED HEALTH CARE EDUCATION/TRAINING PROGRAM

## 2023-10-03 PROCEDURE — G0008 ADMIN INFLUENZA VIRUS VAC: HCPCS | Performed by: STUDENT IN AN ORGANIZED HEALTH CARE EDUCATION/TRAINING PROGRAM

## 2023-10-03 RX ORDER — TRIAMCINOLONE ACETONIDE 40 MG/ML
40 INJECTION, SUSPENSION INTRA-ARTICULAR; INTRAMUSCULAR ONCE
Status: COMPLETED | OUTPATIENT
Start: 2023-10-03 | End: 2023-10-03

## 2023-10-03 RX ADMIN — TRIAMCINOLONE ACETONIDE 40 MG: 40 INJECTION, SUSPENSION INTRA-ARTICULAR; INTRAMUSCULAR at 08:39

## 2023-10-03 NOTE — PROGRESS NOTES
"  Assessment & Plan     Primary localized osteoarthrosis of right lower leg  Rochelle has been feeling sore for the last month and felt it was time for another injection. She had last received an injection 5/2023. She had no issues with today's injection and says she tends to feel the relief the day after. She is not currently interested in orthopedic surgery referral. She gets good relief with these injections.    - triamcinolone (KENALOG-40) injection 40 mg    Need for prophylactic vaccination and inoculation against influenza  Received Flu shot. Will call to schedule a medicare annual wellness visit.     HTN  Blood pressure well controlled today (<140/90) and most home readings are at goal. She notices high readings when she is stressed. Since most are at goal we will continue with 20mg lisinopril daily.               BMI:   Estimated body mass index is 33.48 kg/m  as calculated from the following:    Height as of 5/5/23: 1.6 m (5' 3\").    Weight as of this encounter: 85.7 kg (189 lb).           No follow-ups on file.  Maggie Yee, MS3  [facility] [employer]     Jazmin Huertas MD  [facility] Subjective   Rochelle is a 73 year old, presenting for the following health issues:  Injections (Cortisone Shot R Knee), Hypertension (Has been high, 142/90 yesterday), and Imm/Inj (Flu Shot)      HPI   Osteoarthritis, previous injections in right knee   - been feeling sore for about a month, could tell it was time to come in   - hasn't had any problems with it in the past   - doesn't feel like it's that bad yet, the injections are working so not interested in seeing ortho at the moment   - doesn't feel unstable, sometimes when walking outside on unstable paths    High BP  - has been a little high at home, yesterday 140/90, says it's high just once in awhile but mostly under somewhat stressful situations like dentist and clinic   - denies chest pain, trouble breathing, leg " swelling    Preventative: wants the flu shot and COVID booster                Review of Systems         Objective    /87 (BP Location: Right arm, Patient Position: Sitting, Cuff Size: Adult Regular)   Pulse 84   Wt 85.7 kg (189 lb)   SpO2 97%   BMI 33.48 kg/m    Body mass index is 33.48 kg/m .  Physical Exam   GENERAL: healthy, alert and no distress  MS: no gross musculoskeletal defects noted, no edema. Right knee with no effusion, no joint line pain, no rash or erythema.                 I was present with the medical student who participated in the service and in the documentation of this note. I have verified the history and personally performed the physical exam and medical decision making, and have verified the content of the note, which accurately reflects my assessment of the patient and the plan of care.   Jazmin Huertas MD

## 2023-10-03 NOTE — PROGRESS NOTES
PROCEDURE:  JOINT INJECTION.         After a discussion of risks, benefits and side effects of procedure, informed patient consent was obtained.       The right knee was prepped and draped in the usual clean fashion, betadine was applied to right knee.     ASPIRATION: Not performed.   INJECTION:  Using 2 cc of 1% lidocaine mixed                           with 40 mg of kenalog, the right knee was successfully injected                           without complication.  Patient did not experience some pain                          relief following injection. She felt the same as when she came in. She states usually it takes a day before she has relief.    Jazmin Huertas MD  Family Medicine

## 2023-10-08 ENCOUNTER — HEALTH MAINTENANCE LETTER (OUTPATIENT)
Age: 73
End: 2023-10-08

## 2023-11-20 DIAGNOSIS — K21.9 GASTROESOPHAGEAL REFLUX DISEASE WITHOUT ESOPHAGITIS: ICD-10-CM

## 2023-11-20 RX ORDER — OMEPRAZOLE 20 MG/1
20 TABLET, DELAYED RELEASE ORAL DAILY
Qty: 90 TABLET | Refills: 1 | Status: SHIPPED | OUTPATIENT
Start: 2023-11-20 | End: 2024-05-18

## 2024-02-05 DIAGNOSIS — I10 ESSENTIAL HYPERTENSION: ICD-10-CM

## 2024-02-07 RX ORDER — LISINOPRIL 20 MG/1
20 TABLET ORAL DAILY
Qty: 90 TABLET | Refills: 0 | Status: SHIPPED | OUTPATIENT
Start: 2024-02-07 | End: 2024-02-21

## 2024-02-07 NOTE — TELEPHONE ENCOUNTER
Patient due for blood pressure follow up visit and BMP- could you help her schedule? Thanks!    Jazmin Huertas MD  Family Medicine

## 2024-02-13 ENCOUNTER — OFFICE VISIT (OUTPATIENT)
Dept: FAMILY MEDICINE | Facility: CLINIC | Age: 74
End: 2024-02-13
Payer: COMMERCIAL

## 2024-02-13 VITALS
OXYGEN SATURATION: 97 % | HEART RATE: 79 BPM | SYSTOLIC BLOOD PRESSURE: 142 MMHG | DIASTOLIC BLOOD PRESSURE: 86 MMHG | TEMPERATURE: 98.8 F

## 2024-02-13 DIAGNOSIS — I10 ESSENTIAL HYPERTENSION: Primary | Chronic | ICD-10-CM

## 2024-02-13 LAB
ANION GAP SERPL CALCULATED.3IONS-SCNC: 10 MMOL/L (ref 3–14)
BUN SERPL-MCNC: 17 MG/DL (ref 7–30)
CALCIUM SERPL-MCNC: 8.9 MG/DL (ref 8.5–10.1)
CHLORIDE BLD-SCNC: 108 MMOL/L (ref 94–109)
CO2 SERPL-SCNC: 26 MMOL/L (ref 20–32)
CREAT SERPL-MCNC: 0.9 MG/DL (ref 0.52–1.04)
EGFRCR SERPLBLD CKD-EPI 2021: 68 ML/MIN/1.73M2
GLUCOSE BLD-MCNC: 95 MG/DL (ref 70–99)
POTASSIUM BLD-SCNC: 4.1 MMOL/L (ref 3.4–5.3)
SODIUM SERPL-SCNC: 144 MMOL/L (ref 135–145)

## 2024-02-13 PROCEDURE — 99213 OFFICE O/P EST LOW 20 MIN: CPT | Performed by: STUDENT IN AN ORGANIZED HEALTH CARE EDUCATION/TRAINING PROGRAM

## 2024-02-13 PROCEDURE — 80048 BASIC METABOLIC PNL TOTAL CA: CPT | Performed by: STUDENT IN AN ORGANIZED HEALTH CARE EDUCATION/TRAINING PROGRAM

## 2024-02-13 PROCEDURE — 36415 COLL VENOUS BLD VENIPUNCTURE: CPT | Performed by: STUDENT IN AN ORGANIZED HEALTH CARE EDUCATION/TRAINING PROGRAM

## 2024-02-13 NOTE — PROGRESS NOTES
"  Assessment & Plan     Essential hypertension  Assessment/Plan:  Rochelle is a 72yo F here for hypertension follow-up. She is otherwise healthy. Taking 20mg of lisinopril with no side effects. Her blood pressures are moderately elevated in clinic, higher than what is seen at home though does not check regularly. No symptoms concerning of hyper or hypotension. BMP today within normal limits, no concern for kidney disease or electrolyte abnormalities. Using shared decision making, decided to check Bps at home twice a day for one week to assess her average pressures before making any medication changes. Rochelle will share home blood pressures at the end of the week via Syndero to decide next steps, as well as will schedule her Medicare annual wellness visit.  - Basic Metabolic Panel ([facility]) - Results < 1 hr            BMI  Estimated body mass index is 33.48 kg/m  as calculated from the following:    Height as of 5/5/23: 1.6 m (5' 3\").    Weight as of 10/3/23: 85.7 kg (189 lb).             No follow-ups on file.    Subjective   Rochelle is a 73 year old, presenting for the following health issues:  Hypertension (Recheck BP)      2/13/2024     8:40 AM   Additional Questions   Roomed by beltran puga   Accompanied by self     HPI   Rochelle is doing well today. No changes to her health.     - Doesn't take BP at home very often, but its usually on higher end of normal (upper 130s systolic)  - Eating a well balanced, is very active around the house but does not have a specific exercise regimen  - Loves to decorate, craft, have girls parties  - No cough, swelling, headache, dizziness, chest pain, shortness of breath      Review of Systems  Constitutional, cardiovascular, pulmonary systems are negative, except as otherwise noted.      Objective    BP (!) 142/86 (BP Location: Left arm, Patient Position: Sitting, Cuff Size: Adult Regular)   Pulse 79   Temp 98.8  F (37.1  C)   SpO2 97%   There is no height or weight on file to " calculate BMI.  Physical Exam   GENERAL: alert and no distress  RESP: lungs clear to auscultation - no rales, rhonchi or wheezes  CV: regular rate and rhythm, normal S1 S2, no S3 or S4, no murmur, click or rub, no peripheral edema  MS: no gross musculoskeletal defects noted, no edema    Results for orders placed or performed in visit on 02/13/24 (from the past 24 hour(s))   Basic Metabolic Panel ([facility]) - Results < 1 hr   Result Value Ref Range    Sodium 144 135 - 145 mmol/L    Potassium 4.1 3.4 - 5.3 mmol/L    Chloride 108 94 - 109 mmol/L    Carbon Dioxide (CO2) 26 20 - 32 mmol/L    Anion Gap 10 3 - 14 mmol/L    Urea Nitrogen 17 7 - 30 mg/dL    Creatinine 0.90 0.52 - 1.04 mg/dL    GFR Estimate 68 >60 mL/min/1.73m2    Calcium 8.9 8.5 - 10.1 mg/dL    Glucose 95 70 - 99 mg/dL             Augustina Randall, MS3    Signed Electronically by: Jazmin Huertas MD

## 2024-02-20 ENCOUNTER — MYC MEDICAL ADVICE (OUTPATIENT)
Dept: FAMILY MEDICINE | Facility: CLINIC | Age: 74
End: 2024-02-20
Payer: COMMERCIAL

## 2024-02-20 DIAGNOSIS — I10 ESSENTIAL HYPERTENSION: ICD-10-CM

## 2024-02-21 RX ORDER — LISINOPRIL 40 MG/1
40 TABLET ORAL DAILY
Qty: 60 TABLET | Refills: 1 | Status: SHIPPED | OUTPATIENT
Start: 2024-02-21 | End: 2024-09-23

## 2024-03-10 ENCOUNTER — MYC REFILL (OUTPATIENT)
Dept: FAMILY MEDICINE | Facility: CLINIC | Age: 74
End: 2024-03-10
Payer: COMMERCIAL

## 2024-03-10 DIAGNOSIS — F41.9 ANXIETY: ICD-10-CM

## 2024-03-12 ENCOUNTER — OFFICE VISIT (OUTPATIENT)
Dept: FAMILY MEDICINE | Facility: CLINIC | Age: 74
End: 2024-03-12
Payer: COMMERCIAL

## 2024-03-12 VITALS
SYSTOLIC BLOOD PRESSURE: 134 MMHG | HEART RATE: 84 BPM | BODY MASS INDEX: 32.61 KG/M2 | OXYGEN SATURATION: 95 % | WEIGHT: 191 LBS | TEMPERATURE: 98 F | DIASTOLIC BLOOD PRESSURE: 85 MMHG | HEIGHT: 64 IN

## 2024-03-12 DIAGNOSIS — I10 ESSENTIAL HYPERTENSION: Primary | Chronic | ICD-10-CM

## 2024-03-12 DIAGNOSIS — F41.9 ANXIETY: ICD-10-CM

## 2024-03-12 LAB
ANION GAP SERPL CALCULATED.3IONS-SCNC: 11 MMOL/L (ref 3–14)
BUN SERPL-MCNC: 26 MG/DL (ref 7–30)
CALCIUM SERPL-MCNC: 9.4 MG/DL (ref 8.5–10.1)
CHLORIDE BLD-SCNC: 108 MMOL/L (ref 94–109)
CO2 SERPL-SCNC: 26 MMOL/L (ref 20–32)
CREAT SERPL-MCNC: 0.8 MG/DL (ref 0.52–1.04)
EGFRCR SERPLBLD CKD-EPI 2021: 78 ML/MIN/1.73M2
GLUCOSE BLD-MCNC: 103 MG/DL (ref 70–99)
POTASSIUM BLD-SCNC: 4.6 MMOL/L (ref 3.4–5.3)
SODIUM SERPL-SCNC: 145 MMOL/L (ref 135–145)

## 2024-03-12 PROCEDURE — 99213 OFFICE O/P EST LOW 20 MIN: CPT | Performed by: STUDENT IN AN ORGANIZED HEALTH CARE EDUCATION/TRAINING PROGRAM

## 2024-03-12 PROCEDURE — 80061 LIPID PANEL: CPT | Performed by: STUDENT IN AN ORGANIZED HEALTH CARE EDUCATION/TRAINING PROGRAM

## 2024-03-12 PROCEDURE — 36415 COLL VENOUS BLD VENIPUNCTURE: CPT | Performed by: STUDENT IN AN ORGANIZED HEALTH CARE EDUCATION/TRAINING PROGRAM

## 2024-03-12 PROCEDURE — 80048 BASIC METABOLIC PNL TOTAL CA: CPT | Performed by: STUDENT IN AN ORGANIZED HEALTH CARE EDUCATION/TRAINING PROGRAM

## 2024-03-12 RX ORDER — CITALOPRAM HYDROBROMIDE 10 MG/1
10 TABLET ORAL DAILY
Qty: 90 TABLET | Refills: 3 | Status: SHIPPED | OUTPATIENT
Start: 2024-03-12

## 2024-03-12 RX ORDER — CITALOPRAM HYDROBROMIDE 10 MG/1
10 TABLET ORAL DAILY
Qty: 90 TABLET | Refills: 3 | Status: CANCELLED | OUTPATIENT
Start: 2024-03-12

## 2024-03-12 NOTE — PROGRESS NOTES
"  Assessment & Plan     Essential hypertension  BMP shows normal potassium and sodium and Cr has improved slightly since last check. BP well controlled on lisinopril 40mg daily. If she were to have high Bps again, would add another agent such as hydrochlorothiazide or amlodipine. She will check at home once per week. We are due for lipid for her as well and she is fasting so we'll get that today.  I will send her a [employer] message with result.   - Basic metabolic panel  - Lipid Profile    Anxiety  She had issues getting citalopram refilled- she requested it from pharmacy and they said they contacted the physician over a week ago but I didn't get the request until she put it in through [employer]. She will just request refills through [employer] from here forward.             BMI  Estimated body mass index is 32.79 kg/m  as calculated from the following:    Height as of this encounter: 1.626 m (5' 4\").    Weight as of this encounter: 86.6 kg (191 lb).             No follow-ups on file.    Subjective   Rochelle is a 73 year old, presenting for the following health issues:  Recheck Medication and Hypertension      3/12/2024     8:25 AM   Additional Questions   Roomed by Eliana         3/12/2024    Information    services provided? No     HPI     After Rochelle's home blood pressures were high, we changed her lisinopril from 20mg daily to 40 mg daily.   She has been on the 40mg daily since then.   All numbers at home less than 140/90.  No side effects.     Otherwise she's doing well, enjoying decorating for [employer].       Review of Systems  Constitutional, HEENT, cardiovascular, pulmonary, gi and gu systems are negative, except as otherwise noted.      Objective    /85   Pulse 84   Temp 98  F (36.7  C)   Ht 1.626 m (5' 4\")   Wt 86.6 kg (191 lb)   SpO2 95%   BMI 32.79 kg/m    Body mass index is 32.79 kg/m .  Physical Exam   GENERAL: alert and no distress  RESP: lungs clear to auscultation - no " rales, rhonchi or wheezes  CV: regular rate and rhythm, normal S1 S2, no S3 or S4, no murmur, click or rub, no peripheral edema    Results for orders placed or performed in visit on 03/12/24 (from the past 24 hour(s))   Basic metabolic panel   Result Value Ref Range    Sodium 145 135 - 145 mmol/L    Potassium 4.6 3.4 - 5.3 mmol/L    Chloride 108 94 - 109 mmol/L    Carbon Dioxide (CO2) 26 20 - 32 mmol/L    Anion Gap 11 3 - 14 mmol/L    Urea Nitrogen 26 7 - 30 mg/dL    Creatinine 0.80 0.52 - 1.04 mg/dL    GFR Estimate 78 >60 mL/min/1.73m2    Calcium 9.4 8.5 - 10.1 mg/dL    Glucose 103 (H) 70 - 99 mg/dL           Signed Electronically by: Jazmin Huertas MD

## 2024-03-13 LAB
CHOLEST SERPL-MCNC: 191 MG/DL
HDLC SERPL-MCNC: 59 MG/DL
LDLC SERPL CALC-MCNC: 103 MG/DL
NONHDLC SERPL-MCNC: 132 MG/DL
TRIGL SERPL-MCNC: 143 MG/DL

## 2024-05-18 ENCOUNTER — MYC REFILL (OUTPATIENT)
Dept: FAMILY MEDICINE | Facility: CLINIC | Age: 74
End: 2024-05-18
Payer: COMMERCIAL

## 2024-05-18 DIAGNOSIS — K21.9 GASTROESOPHAGEAL REFLUX DISEASE WITHOUT ESOPHAGITIS: ICD-10-CM

## 2024-05-21 ENCOUNTER — TELEPHONE (OUTPATIENT)
Dept: FAMILY MEDICINE | Facility: CLINIC | Age: 74
End: 2024-05-21
Payer: COMMERCIAL

## 2024-05-21 NOTE — TELEPHONE ENCOUNTER
[facility] phone call message- medication clarification/question:    Full Medication Name: omeprazole (PRILOSEC OTC) 20 MG EC tablet        Question: patient called in requesting refill on above medication --please send to below pharmacy if able -- thank you      Pharmacy confirmed as [id number] [[address] Yes    OK to leave a message on voice mail? Yes    Primary language: English      needed? No    Call taken on May 21, 2024 at 3:35 PM by Darlene Do

## 2024-05-22 RX ORDER — OMEPRAZOLE 20 MG/1
20 TABLET, DELAYED RELEASE ORAL DAILY
Qty: 90 TABLET | Refills: 1 | Status: SHIPPED | OUTPATIENT
Start: 2024-05-22

## 2024-09-09 DIAGNOSIS — I10 ESSENTIAL HYPERTENSION: Chronic | ICD-10-CM

## 2024-09-10 RX ORDER — ROSUVASTATIN CALCIUM 5 MG/1
5 TABLET, COATED ORAL DAILY
Qty: 90 TABLET | Refills: 1 | Status: SHIPPED | OUTPATIENT
Start: 2024-09-10

## 2024-09-23 DIAGNOSIS — I10 ESSENTIAL HYPERTENSION: ICD-10-CM

## 2024-09-24 RX ORDER — LISINOPRIL 40 MG/1
40 TABLET ORAL DAILY
Qty: 60 TABLET | Refills: 1 | Status: SHIPPED | OUTPATIENT
Start: 2024-09-24

## 2024-11-09 ENCOUNTER — MYC REFILL (OUTPATIENT)
Dept: FAMILY MEDICINE | Facility: CLINIC | Age: 74
End: 2024-11-09
Payer: COMMERCIAL

## 2024-11-09 DIAGNOSIS — K21.9 GASTROESOPHAGEAL REFLUX DISEASE WITHOUT ESOPHAGITIS: ICD-10-CM

## 2024-11-11 RX ORDER — OMEPRAZOLE 20 MG/1
20 TABLET, DELAYED RELEASE ORAL DAILY
Qty: 90 TABLET | Refills: 1 | Status: SHIPPED | OUTPATIENT
Start: 2024-11-11

## 2024-11-12 ENCOUNTER — MYC REFILL (OUTPATIENT)
Dept: FAMILY MEDICINE | Facility: CLINIC | Age: 74
End: 2024-11-12
Payer: COMMERCIAL

## 2024-11-12 DIAGNOSIS — K21.9 GASTROESOPHAGEAL REFLUX DISEASE WITHOUT ESOPHAGITIS: ICD-10-CM

## 2024-11-12 RX ORDER — OMEPRAZOLE 20 MG/1
20 TABLET, DELAYED RELEASE ORAL DAILY
Qty: 90 TABLET | Refills: 1 | OUTPATIENT
Start: 2024-11-12

## 2024-11-12 NOTE — TELEPHONE ENCOUNTER
Requesting change to capsules instead of tablets. Outside RN standing orders, routing to PCP to review and fill. Kvng JOY

## 2024-11-16 ENCOUNTER — MYC MEDICAL ADVICE (OUTPATIENT)
Dept: FAMILY MEDICINE | Facility: CLINIC | Age: 74
End: 2024-11-16
Payer: COMMERCIAL

## 2024-11-16 DIAGNOSIS — K21.9 GASTROESOPHAGEAL REFLUX DISEASE WITHOUT ESOPHAGITIS: ICD-10-CM

## 2024-11-18 RX ORDER — OMEPRAZOLE 20 MG/1
20 TABLET, DELAYED RELEASE ORAL DAILY
Qty: 90 TABLET | Refills: 1 | Status: SHIPPED | OUTPATIENT
Start: 2024-11-18

## 2024-12-01 ENCOUNTER — HEALTH MAINTENANCE LETTER (OUTPATIENT)
Age: 74
End: 2024-12-01

## 2025-01-18 ENCOUNTER — MYC REFILL (OUTPATIENT)
Dept: FAMILY MEDICINE | Facility: CLINIC | Age: 75
End: 2025-01-18
Payer: COMMERCIAL

## 2025-01-18 DIAGNOSIS — I10 ESSENTIAL HYPERTENSION: ICD-10-CM

## 2025-01-20 RX ORDER — LISINOPRIL 40 MG/1
40 TABLET ORAL DAILY
Qty: 60 TABLET | Refills: 1 | Status: SHIPPED | OUTPATIENT
Start: 2025-01-20

## 2025-03-01 ENCOUNTER — MYC REFILL (OUTPATIENT)
Dept: FAMILY MEDICINE | Facility: CLINIC | Age: 75
End: 2025-03-01
Payer: COMMERCIAL

## 2025-03-01 DIAGNOSIS — F41.9 ANXIETY: ICD-10-CM

## 2025-03-03 ASSESSMENT — ANXIETY QUESTIONNAIRES
3. WORRYING TOO MUCH ABOUT DIFFERENT THINGS: SEVERAL DAYS
7. FEELING AFRAID AS IF SOMETHING AWFUL MIGHT HAPPEN: SEVERAL DAYS
7. FEELING AFRAID AS IF SOMETHING AWFUL MIGHT HAPPEN: SEVERAL DAYS
IF YOU CHECKED OFF ANY PROBLEMS ON THIS QUESTIONNAIRE, HOW DIFFICULT HAVE THESE PROBLEMS MADE IT FOR YOU TO DO YOUR WORK, TAKE CARE OF THINGS AT HOME, OR GET ALONG WITH OTHER PEOPLE: SOMEWHAT DIFFICULT
GAD7 TOTAL SCORE: 9
6. BECOMING EASILY ANNOYED OR IRRITABLE: SEVERAL DAYS
2. NOT BEING ABLE TO STOP OR CONTROL WORRYING: MORE THAN HALF THE DAYS
GAD7 TOTAL SCORE: 9
GAD7 TOTAL SCORE: 9
5. BEING SO RESTLESS THAT IT IS HARD TO SIT STILL: SEVERAL DAYS
4. TROUBLE RELAXING: SEVERAL DAYS
8. IF YOU CHECKED OFF ANY PROBLEMS, HOW DIFFICULT HAVE THESE MADE IT FOR YOU TO DO YOUR WORK, TAKE CARE OF THINGS AT HOME, OR GET ALONG WITH OTHER PEOPLE?: SOMEWHAT DIFFICULT
1. FEELING NERVOUS, ANXIOUS, OR ON EDGE: MORE THAN HALF THE DAYS

## 2025-03-06 RX ORDER — CITALOPRAM HYDROBROMIDE 10 MG/1
10 TABLET ORAL DAILY
Qty: 90 TABLET | Refills: 3 | Status: SHIPPED | OUTPATIENT
Start: 2025-03-06

## 2025-03-06 NOTE — TELEPHONE ENCOUNTER
Patient calling back regarding refill, stated she is almost out and needs a refill. Please review and send prescription  if able thank you.

## 2025-03-08 ENCOUNTER — MYC REFILL (OUTPATIENT)
Dept: FAMILY MEDICINE | Facility: CLINIC | Age: 75
End: 2025-03-08
Payer: COMMERCIAL

## 2025-03-08 DIAGNOSIS — I10 ESSENTIAL HYPERTENSION: Chronic | ICD-10-CM

## 2025-03-10 RX ORDER — ROSUVASTATIN CALCIUM 5 MG/1
5 TABLET, COATED ORAL DAILY
Qty: 90 TABLET | Refills: 1 | Status: SHIPPED | OUTPATIENT
Start: 2025-03-10

## 2025-03-26 ENCOUNTER — PATIENT OUTREACH (OUTPATIENT)
Dept: CARE COORDINATION | Facility: CLINIC | Age: 75
End: 2025-03-26
Payer: COMMERCIAL

## 2025-03-31 ENCOUNTER — ANCILLARY PROCEDURE (OUTPATIENT)
Dept: MAMMOGRAPHY | Facility: CLINIC | Age: 75
End: 2025-03-31
Attending: STUDENT IN AN ORGANIZED HEALTH CARE EDUCATION/TRAINING PROGRAM
Payer: COMMERCIAL

## 2025-03-31 DIAGNOSIS — Z12.31 VISIT FOR SCREENING MAMMOGRAM: ICD-10-CM

## 2025-03-31 PROCEDURE — 77063 BREAST TOMOSYNTHESIS BI: CPT

## 2025-04-09 ENCOUNTER — PATIENT OUTREACH (OUTPATIENT)
Dept: CARE COORDINATION | Facility: CLINIC | Age: 75
End: 2025-04-09
Payer: COMMERCIAL

## 2025-05-17 ENCOUNTER — MYC REFILL (OUTPATIENT)
Dept: FAMILY MEDICINE | Facility: CLINIC | Age: 75
End: 2025-05-17
Payer: COMMERCIAL

## 2025-05-17 DIAGNOSIS — I10 ESSENTIAL HYPERTENSION: ICD-10-CM

## 2025-05-17 DIAGNOSIS — K21.9 GASTROESOPHAGEAL REFLUX DISEASE WITHOUT ESOPHAGITIS: ICD-10-CM

## 2025-05-20 RX ORDER — LISINOPRIL 40 MG/1
40 TABLET ORAL DAILY
Qty: 60 TABLET | Refills: 0 | Status: SHIPPED | OUTPATIENT
Start: 2025-05-20

## 2025-05-20 RX ORDER — OMEPRAZOLE 20 MG/1
20 CAPSULE, DELAYED RELEASE ORAL DAILY
Qty: 90 CAPSULE | Refills: 1 | Status: SHIPPED | OUTPATIENT
Start: 2025-05-20

## 2025-07-17 DIAGNOSIS — I10 ESSENTIAL HYPERTENSION: ICD-10-CM

## 2025-07-17 RX ORDER — LISINOPRIL 40 MG/1
40 TABLET ORAL DAILY
Qty: 60 TABLET | Refills: 0 | Status: SHIPPED | OUTPATIENT
Start: 2025-07-17

## 2025-07-17 NOTE — TELEPHONE ENCOUNTER
Most recent labs and VS were all done past 12 months. Due for lab recheck, BP recheck, as well as appt with authorizing provider. Outside of RN standing orders. Routing to PCP for review and refill. Candice Clements RN

## 2025-07-19 ENCOUNTER — MYC REFILL (OUTPATIENT)
Dept: FAMILY MEDICINE | Facility: CLINIC | Age: 75
End: 2025-07-19
Payer: COMMERCIAL

## 2025-07-19 DIAGNOSIS — I10 ESSENTIAL HYPERTENSION: ICD-10-CM

## 2025-07-24 RX ORDER — LISINOPRIL 40 MG/1
40 TABLET ORAL DAILY
Qty: 90 TABLET | Refills: 1 | Status: SHIPPED | OUTPATIENT
Start: 2025-07-24